# Patient Record
Sex: MALE | Race: WHITE | NOT HISPANIC OR LATINO | Employment: FULL TIME | URBAN - METROPOLITAN AREA
[De-identification: names, ages, dates, MRNs, and addresses within clinical notes are randomized per-mention and may not be internally consistent; named-entity substitution may affect disease eponyms.]

---

## 2019-10-19 ENCOUNTER — HOSPITAL ENCOUNTER (EMERGENCY)
Facility: HOSPITAL | Age: 29
Discharge: HOME | End: 2019-10-19
Attending: EMERGENCY MEDICINE | Admitting: EMERGENCY MEDICINE

## 2019-10-19 ENCOUNTER — APPOINTMENT (OUTPATIENT)
Dept: ULTRASOUND IMAGING | Facility: HOSPITAL | Age: 29
End: 2019-10-19

## 2019-10-19 ENCOUNTER — APPOINTMENT (OUTPATIENT)
Dept: CT IMAGING | Facility: HOSPITAL | Age: 29
End: 2019-10-19

## 2019-10-19 ENCOUNTER — HOSPITAL ENCOUNTER (EMERGENCY)
Facility: HOSPITAL | Age: 29
Discharge: HOME | End: 2019-10-20
Attending: EMERGENCY MEDICINE | Admitting: EMERGENCY MEDICINE

## 2019-10-19 VITALS
BODY MASS INDEX: 45.1 KG/M2 | OXYGEN SATURATION: 98 % | WEIGHT: 315 LBS | TEMPERATURE: 97.8 F | HEART RATE: 71 BPM | SYSTOLIC BLOOD PRESSURE: 136 MMHG | HEIGHT: 70 IN | RESPIRATION RATE: 16 BRPM | DIASTOLIC BLOOD PRESSURE: 85 MMHG

## 2019-10-19 VITALS
HEART RATE: 96 BPM | BODY MASS INDEX: 45.1 KG/M2 | SYSTOLIC BLOOD PRESSURE: 135 MMHG | HEIGHT: 70 IN | TEMPERATURE: 99.7 F | RESPIRATION RATE: 20 BRPM | WEIGHT: 315 LBS | DIASTOLIC BLOOD PRESSURE: 100 MMHG | OXYGEN SATURATION: 97 %

## 2019-10-19 DIAGNOSIS — N23 URETERAL COLIC: Primary | ICD-10-CM

## 2019-10-19 DIAGNOSIS — N20.0 RIGHT KIDNEY STONE: ICD-10-CM

## 2019-10-19 DIAGNOSIS — N28.1 RENAL CYST, LEFT: ICD-10-CM

## 2019-10-19 DIAGNOSIS — R10.9 LEFT FLANK PAIN: Primary | ICD-10-CM

## 2019-10-19 LAB
ALBUMIN SERPL-MCNC: 4.9 G/DL (ref 3.5–5.2)
ALBUMIN/GLOB SERPL: 1.5 G/DL
ALP SERPL-CCNC: 54 U/L (ref 39–117)
ALT SERPL W P-5'-P-CCNC: 23 U/L (ref 1–41)
ANION GAP SERPL CALCULATED.3IONS-SCNC: 8 MMOL/L (ref 5–15)
AST SERPL-CCNC: 16 U/L (ref 1–40)
BASOPHILS # BLD AUTO: 0.05 10*3/MM3 (ref 0–0.2)
BASOPHILS NFR BLD AUTO: 0.4 % (ref 0–1.5)
BILIRUB SERPL-MCNC: 0.4 MG/DL (ref 0.2–1.2)
BILIRUB UR QL STRIP: NEGATIVE
BUN BLD-MCNC: 17 MG/DL (ref 6–20)
BUN/CREAT SERPL: 18.7 (ref 7–25)
CALCIUM SPEC-SCNC: 9.4 MG/DL (ref 8.6–10.5)
CHLORIDE SERPL-SCNC: 102 MMOL/L (ref 98–107)
CLARITY UR: CLEAR
CO2 SERPL-SCNC: 29 MMOL/L (ref 22–29)
COLOR UR: YELLOW
CREAT BLD-MCNC: 0.91 MG/DL (ref 0.76–1.27)
DEPRECATED RDW RBC AUTO: 39.4 FL (ref 37–54)
EOSINOPHIL # BLD AUTO: 0.15 10*3/MM3 (ref 0–0.4)
EOSINOPHIL NFR BLD AUTO: 1.1 % (ref 0.3–6.2)
ERYTHROCYTE [DISTWIDTH] IN BLOOD BY AUTOMATED COUNT: 12.8 % (ref 12.3–15.4)
GFR SERPL CREATININE-BSD FRML MDRD: 99 ML/MIN/1.73
GLOBULIN UR ELPH-MCNC: 3.3 GM/DL
GLUCOSE BLD-MCNC: 110 MG/DL (ref 65–99)
GLUCOSE UR STRIP-MCNC: NEGATIVE MG/DL
HCT VFR BLD AUTO: 45 % (ref 37.5–51)
HGB BLD-MCNC: 15 G/DL (ref 13–17.7)
HGB UR QL STRIP.AUTO: NEGATIVE
HOLD SPECIMEN: NORMAL
IMM GRANULOCYTES # BLD AUTO: 0.07 10*3/MM3 (ref 0–0.05)
IMM GRANULOCYTES NFR BLD AUTO: 0.5 % (ref 0–0.5)
KETONES UR QL STRIP: NEGATIVE
LEUKOCYTE ESTERASE UR QL STRIP.AUTO: NEGATIVE
LIPASE SERPL-CCNC: 17 U/L (ref 13–60)
LYMPHOCYTES # BLD AUTO: 2.39 10*3/MM3 (ref 0.7–3.1)
LYMPHOCYTES NFR BLD AUTO: 17.8 % (ref 19.6–45.3)
MCH RBC QN AUTO: 28.3 PG (ref 26.6–33)
MCHC RBC AUTO-ENTMCNC: 33.3 G/DL (ref 31.5–35.7)
MCV RBC AUTO: 84.9 FL (ref 79–97)
MONOCYTES # BLD AUTO: 0.77 10*3/MM3 (ref 0.1–0.9)
MONOCYTES NFR BLD AUTO: 5.7 % (ref 5–12)
NEUTROPHILS # BLD AUTO: 10.03 10*3/MM3 (ref 1.7–7)
NEUTROPHILS NFR BLD AUTO: 74.5 % (ref 42.7–76)
NITRITE UR QL STRIP: NEGATIVE
NRBC BLD AUTO-RTO: 0 /100 WBC (ref 0–0.2)
PH UR STRIP.AUTO: 6 [PH] (ref 5–8)
PLATELET # BLD AUTO: 265 10*3/MM3 (ref 140–450)
PMV BLD AUTO: 8.6 FL (ref 6–12)
POTASSIUM BLD-SCNC: 4.3 MMOL/L (ref 3.5–5.2)
PROT SERPL-MCNC: 8.2 G/DL (ref 6–8.5)
PROT UR QL STRIP: NEGATIVE
RBC # BLD AUTO: 5.3 10*6/MM3 (ref 4.14–5.8)
SODIUM BLD-SCNC: 139 MMOL/L (ref 136–145)
SP GR UR STRIP: 1.02 (ref 1–1.03)
UROBILINOGEN UR QL STRIP: NORMAL
WBC NRBC COR # BLD: 13.46 10*3/MM3 (ref 3.4–10.8)
WHOLE BLOOD HOLD SPECIMEN: NORMAL

## 2019-10-19 PROCEDURE — 83690 ASSAY OF LIPASE: CPT | Performed by: EMERGENCY MEDICINE

## 2019-10-19 PROCEDURE — 80053 COMPREHEN METABOLIC PANEL: CPT | Performed by: EMERGENCY MEDICINE

## 2019-10-19 PROCEDURE — 25010000002 HYDROMORPHONE 1 MG/ML SOLUTION: Performed by: EMERGENCY MEDICINE

## 2019-10-19 PROCEDURE — 25010000002 IOPAMIDOL 61 % SOLUTION: Performed by: EMERGENCY MEDICINE

## 2019-10-19 PROCEDURE — 85025 COMPLETE CBC W/AUTO DIFF WBC: CPT | Performed by: EMERGENCY MEDICINE

## 2019-10-19 PROCEDURE — 96375 TX/PRO/DX INJ NEW DRUG ADDON: CPT

## 2019-10-19 PROCEDURE — 25010000002 KETOROLAC TROMETHAMINE PER 15 MG: Performed by: EMERGENCY MEDICINE

## 2019-10-19 PROCEDURE — 96374 THER/PROPH/DIAG INJ IV PUSH: CPT

## 2019-10-19 PROCEDURE — 74176 CT ABD & PELVIS W/O CONTRAST: CPT

## 2019-10-19 PROCEDURE — 99283 EMERGENCY DEPT VISIT LOW MDM: CPT

## 2019-10-19 PROCEDURE — 25010000002 ONDANSETRON PER 1 MG: Performed by: EMERGENCY MEDICINE

## 2019-10-19 PROCEDURE — 81003 URINALYSIS AUTO W/O SCOPE: CPT | Performed by: EMERGENCY MEDICINE

## 2019-10-19 PROCEDURE — 74177 CT ABD & PELVIS W/CONTRAST: CPT

## 2019-10-19 PROCEDURE — 76775 US EXAM ABDO BACK WALL LIM: CPT

## 2019-10-19 PROCEDURE — 99284 EMERGENCY DEPT VISIT MOD MDM: CPT

## 2019-10-19 RX ORDER — CYCLOBENZAPRINE HCL 10 MG
10 TABLET ORAL 3 TIMES DAILY PRN
Qty: 15 TABLET | Refills: 0 | OUTPATIENT
Start: 2019-10-19 | End: 2021-01-22

## 2019-10-19 RX ORDER — KETOROLAC TROMETHAMINE 15 MG/ML
15 INJECTION, SOLUTION INTRAMUSCULAR; INTRAVENOUS ONCE
Status: COMPLETED | OUTPATIENT
Start: 2019-10-19 | End: 2019-10-19

## 2019-10-19 RX ORDER — ONDANSETRON 4 MG/1
4 TABLET, ORALLY DISINTEGRATING ORAL EVERY 6 HOURS PRN
Qty: 15 TABLET | Refills: 0 | OUTPATIENT
Start: 2019-10-19 | End: 2021-01-22

## 2019-10-19 RX ORDER — ONDANSETRON 2 MG/ML
4 INJECTION INTRAMUSCULAR; INTRAVENOUS ONCE
Status: COMPLETED | OUTPATIENT
Start: 2019-10-19 | End: 2019-10-19

## 2019-10-19 RX ORDER — IBUPROFEN 800 MG/1
800 TABLET ORAL EVERY 8 HOURS PRN
Qty: 30 TABLET | Refills: 0 | OUTPATIENT
Start: 2019-10-19 | End: 2021-01-22

## 2019-10-19 RX ORDER — SODIUM CHLORIDE 0.9 % (FLUSH) 0.9 %
10 SYRINGE (ML) INJECTION AS NEEDED
Status: DISCONTINUED | OUTPATIENT
Start: 2019-10-19 | End: 2019-10-19 | Stop reason: HOSPADM

## 2019-10-19 RX ORDER — HYDROCODONE BITARTRATE AND ACETAMINOPHEN 5; 325 MG/1; MG/1
1 TABLET ORAL EVERY 6 HOURS PRN
Qty: 15 TABLET | Refills: 0 | OUTPATIENT
Start: 2019-10-19 | End: 2021-01-22

## 2019-10-19 RX ORDER — SODIUM CHLORIDE 0.9 % (FLUSH) 0.9 %
10 SYRINGE (ML) INJECTION AS NEEDED
Status: DISCONTINUED | OUTPATIENT
Start: 2019-10-19 | End: 2019-10-20 | Stop reason: HOSPADM

## 2019-10-19 RX ORDER — KETOROLAC TROMETHAMINE 30 MG/ML
30 INJECTION, SOLUTION INTRAMUSCULAR; INTRAVENOUS ONCE
Status: COMPLETED | OUTPATIENT
Start: 2019-10-19 | End: 2019-10-19

## 2019-10-19 RX ADMIN — HYDROMORPHONE HYDROCHLORIDE 1 MG: 1 INJECTION, SOLUTION INTRAMUSCULAR; INTRAVENOUS; SUBCUTANEOUS at 10:40

## 2019-10-19 RX ADMIN — KETOROLAC TROMETHAMINE 30 MG: 30 INJECTION, SOLUTION INTRAMUSCULAR at 10:40

## 2019-10-19 RX ADMIN — KETOROLAC TROMETHAMINE 15 MG: 15 INJECTION, SOLUTION INTRAMUSCULAR; INTRAVENOUS at 22:35

## 2019-10-19 RX ADMIN — ONDANSETRON 4 MG: 2 INJECTION INTRAMUSCULAR; INTRAVENOUS at 10:39

## 2019-10-19 RX ADMIN — IOPAMIDOL 100 ML: 612 INJECTION, SOLUTION INTRAVENOUS at 22:42

## 2019-10-19 RX ADMIN — HYDROMORPHONE HYDROCHLORIDE 1 MG: 1 INJECTION, SOLUTION INTRAMUSCULAR; INTRAVENOUS; SUBCUTANEOUS at 22:35

## 2019-10-19 RX ADMIN — SODIUM CHLORIDE 1000 ML: 9 INJECTION, SOLUTION INTRAVENOUS at 22:35

## 2019-10-19 RX ADMIN — SODIUM CHLORIDE 1000 ML: 9 INJECTION, SOLUTION INTRAVENOUS at 10:39

## 2019-10-19 NOTE — DISCHARGE INSTRUCTIONS
Follow up with one of the [facility] Primary Care Providers below to setup primary care. If you need assistance coordinating a primary care appointment with a [facility] Primary Care Provider, please contact the Primary Care Coordinators at [phone] for appointment scheduling.    [facility], Primary Care   [address] , [address]   [place], [place] [address] [phone]    [facility] Internal Medicine & Endocrinology  [address], [address]  [place] [address] [phone]    [facility] Family Medicine  [address], [address]   [place] [address] [phone]    [facility] Primary Care  [address], [address]  [place], [place] [id number] [phone]    [facility], Primary Care,   [address], [address]   [place], [place] [address] [phone]    [facility] Primary Care  [address]  [place], [place] [id number] [phone]    [facility], Primary Care  [address], [address]  [place], [place] [address] [phone]    [facility] Internal Medicine & Pediatrics  [address], [address]   [place] [address] [phone]    [facility], Primary Care  [address] [place] [address] [phone]      [facility] Primary Care  [address], [address]   [place] [address] [phone]    [facility] Family Medicine  [address] [place] [address] [phone]

## 2019-10-19 NOTE — ED PROVIDER NOTES
Subjective   Pt is a 28 yo male presenting to ED with complaints of left flank pain. He describes sudden onset of sharp left flank pain that radiates into his left abdomen. He was given a dose of Toradol yesterday with relief of the pain and was also adjusted by his chiropractor. Pain returned last night and has been constant with waves of sharp pain. He is having nausea but denies vomiting, diarrhea, urinary sx, scrotal pain / swelling, fever, chills, CP, SOB or dizziness. He denies hx of kidney stones. Pt is a  and does heavy lifting but denies a recent known injury. He takes no meds on a daily basis and denies drug, ETOH or tobacco use.          History provided by:  Patient and relative  Flank Pain   Pain location:  L flank  Pain quality: sharp    Pain radiates to:  LLQ  Pain severity:  Moderate  Onset quality:  Sudden  Duration:  1 day  Progression:  Waxing and waning  Chronicity:  New  Relieved by:  NSAIDs  Worsened by:  Nothing  Associated symptoms: nausea    Associated symptoms: no chest pain, no chills, no constipation, no cough, no diarrhea, no dysuria, no fatigue, no fever, no hematuria, no shortness of breath, no sore throat and no vomiting    Risk factors: obesity    Risk factors: no alcohol abuse and has not had multiple surgeries        Review of Systems   Constitutional: Negative for chills, fatigue and fever.   HENT: Negative for congestion, ear pain, sore throat and trouble swallowing.    Eyes: Negative for pain, redness and visual disturbance.   Respiratory: Negative for cough and shortness of breath.    Cardiovascular: Negative for chest pain and leg swelling.   Gastrointestinal: Positive for nausea. Negative for abdominal pain, blood in stool, constipation, diarrhea and vomiting.   Genitourinary: Positive for flank pain. Negative for difficulty urinating, dysuria, hematuria, scrotal swelling and testicular pain.   Musculoskeletal: Negative for arthralgias, back pain and joint  swelling.   Skin: Negative.  Negative for rash and wound.   Allergic/Immunologic: Negative.    Neurological: Negative for dizziness, syncope, weakness, numbness and headaches.   Psychiatric/Behavioral: Negative for confusion.   All other systems reviewed and are negative.      History reviewed. No pertinent past medical history.    No Known Allergies    Past Surgical History:   Procedure Laterality Date   • KNEE SURGERY         History reviewed. No pertinent family history.    Social History     Socioeconomic History   • Marital status:      Spouse name: Not on file   • Number of children: Not on file   • Years of education: Not on file   • Highest education level: Not on file   Tobacco Use   • Smoking status: Never Smoker   Substance and Sexual Activity   • Alcohol use: Yes     Comment: socially   • Drug use: No   • Sexual activity: Defer           Objective   Physical Exam   Constitutional: He is oriented to person, place, and time. He appears well-developed and well-nourished.   HENT:   Head: Atraumatic.   Nose: Nose normal.   Eyes: Conjunctivae, EOM and lids are normal. Pupils are equal, round, and reactive to light.   Neck: Normal range of motion. Neck supple.   Cardiovascular: Normal rate, regular rhythm and normal heart sounds.   Pulmonary/Chest: Effort normal and breath sounds normal.   Abdominal: Soft. He exhibits no distension. There is no tenderness. There is no rebound and no guarding.   Musculoskeletal: Normal range of motion. He exhibits no edema, tenderness or deformity.   Mild left flank TTP but patient describes an internal pain not palpable    Neurological: He is alert and oriented to person, place, and time. He has normal strength. No sensory deficit.   Skin: Skin is warm, dry and intact.   Psychiatric: He has a normal mood and affect. His behavior is normal.   Nursing note and vitals reviewed.      Procedures           ED Course  ED Course as of Oct 19 1739   Sat Oct 19, 2019   1206  [id number]. JORDAN query complete. Treatment plan to include limited course of prescribed  controlled substance. Risks including addiction, benefits, and alternatives presented to patient.   -RT  [CN]      ED Course User Index  [CN] Carmel Finch      Discussed results with patient and family. Will dc home on course of Norco, Flexeril, Zofran and Motrin. Discussed possible muscle spasm or recently passed stone. He will f/u with PCP. He will return to ED if new or worse sx.     Discussed patient and CT reading with Dr. Vaca.     Recent Results (from the past 24 hour(s))   Urinalysis With Microscopic If Indicated (No Culture) - Urine, Clean Catch    Collection Time: 10/19/19 10:09 AM   Result Value Ref Range    Color, UA Yellow Yellow, Straw    Appearance, UA Clear Clear    pH, UA 6.0 5.0 - 8.0    Specific Gravity, UA 1.020 1.001 - 1.030    Glucose, UA Negative Negative    Ketones, UA Negative Negative    Bilirubin, UA Negative Negative    Blood, UA Negative Negative    Protein, UA Negative Negative    Leuk Esterase, UA Negative Negative    Nitrite, UA Negative Negative    Urobilinogen, UA 0.2 E.U./dL 0.2 - 1.0 E.U./dL   Comprehensive Metabolic Panel    Collection Time: 10/19/19 10:13 AM   Result Value Ref Range    Glucose 110 (H) 65 - 99 mg/dL    BUN 17 6 - 20 mg/dL    Creatinine 0.91 0.76 - 1.27 mg/dL    Sodium 139 136 - 145 mmol/L    Potassium 4.3 3.5 - 5.2 mmol/L    Chloride 102 98 - 107 mmol/L    CO2 29.0 22.0 - 29.0 mmol/L    Calcium 9.4 8.6 - 10.5 mg/dL    Total Protein 8.2 6.0 - 8.5 g/dL    Albumin 4.90 3.50 - 5.20 g/dL    ALT (SGPT) 23 1 - 41 U/L    AST (SGOT) 16 1 - 40 U/L    Alkaline Phosphatase 54 39 - 117 U/L    Total Bilirubin 0.4 0.2 - 1.2 mg/dL    eGFR Non African Amer 99 >60 mL/min/1.73    Globulin 3.3 gm/dL    A/G Ratio 1.5 g/dL    BUN/Creatinine Ratio 18.7 7.0 - 25.0    Anion Gap 8.0 5.0 - 15.0 mmol/L   Lipase    Collection Time: 10/19/19 10:13 AM   Result Value Ref Range    Lipase 17 13 - 60  U/L   Light Blue Top    Collection Time: 10/19/19 10:13 AM   Result Value Ref Range    Extra Tube hold for add-on    Green Top (Gel)    Collection Time: 10/19/19 10:13 AM   Result Value Ref Range    Extra Tube Hold for add-ons.    Lavender Top    Collection Time: 10/19/19 10:13 AM   Result Value Ref Range    Extra Tube hold for add-on    Gold Top - SST    Collection Time: 10/19/19 10:13 AM   Result Value Ref Range    Extra Tube Hold for add-ons.    CBC Auto Differential    Collection Time: 10/19/19 10:13 AM   Result Value Ref Range    WBC 13.46 (H) 3.40 - 10.80 10*3/mm3    RBC 5.30 4.14 - 5.80 10*6/mm3    Hemoglobin 15.0 13.0 - 17.7 g/dL    Hematocrit 45.0 37.5 - 51.0 %    MCV 84.9 79.0 - 97.0 fL    MCH 28.3 26.6 - 33.0 pg    MCHC 33.3 31.5 - 35.7 g/dL    RDW 12.8 12.3 - 15.4 %    RDW-SD 39.4 37.0 - 54.0 fl    MPV 8.6 6.0 - 12.0 fL    Platelets 265 140 - 450 10*3/mm3    Neutrophil % 74.5 42.7 - 76.0 %    Lymphocyte % 17.8 (L) 19.6 - 45.3 %    Monocyte % 5.7 5.0 - 12.0 %    Eosinophil % 1.1 0.3 - 6.2 %    Basophil % 0.4 0.0 - 1.5 %    Immature Grans % 0.5 0.0 - 0.5 %    Neutrophils, Absolute 10.03 (H) 1.70 - 7.00 10*3/mm3    Lymphocytes, Absolute 2.39 0.70 - 3.10 10*3/mm3    Monocytes, Absolute 0.77 0.10 - 0.90 10*3/mm3    Eosinophils, Absolute 0.15 0.00 - 0.40 10*3/mm3    Basophils, Absolute 0.05 0.00 - 0.20 10*3/mm3    Immature Grans, Absolute 0.07 (H) 0.00 - 0.05 10*3/mm3    nRBC 0.0 0.0 - 0.2 /100 WBC     Note: In addition to lab results from this visit, the labs listed above may include labs taken at another facility or during a different encounter within the last 24 hours. Please correlate lab times with ED admission and discharge times for further clarification of the services performed during this visit.    CT Abdomen Pelvis Without Contrast   Final Result   Tiny 1 mm nonobstructing stone seen on the right kidney with   small cyst on the left kidney. No acute intra-abdominal or pelvic   abnormality.      "  DICTATED:   10/19/2019   EDITED/ls :   10/19/2019            This report was finalized on 10/19/2019 5:10 PM by Dr. Alysia Wilde MD.            Vitals:    10/19/19 0956 10/19/19 1100 10/19/19 1200 10/19/19 1236   BP: 156/89 116/71 122/66 136/85   BP Location: Right arm   Left arm   Patient Position: Sitting   Lying   Pulse: 85   71   Resp: 18   16   Temp: 97.8 °F (36.6 °C)      TempSrc: Oral      SpO2: 98% 97% 98% 98%   Weight: (!) 156 kg (343 lb)      Height: 177.8 cm (70\")        Medications   ketorolac (TORADOL) injection 30 mg (30 mg Intravenous Given 10/19/19 1040)   HYDROmorphone (DILAUDID) injection 1 mg (1 mg Intravenous Given 10/19/19 1040)   ondansetron (ZOFRAN) injection 4 mg (4 mg Intravenous Given 10/19/19 1039)   sodium chloride 0.9 % bolus 1,000 mL (0 mL Intravenous Stopped 10/19/19 1109)     ECG/EMG Results (last 24 hours)     ** No results found for the last 24 hours. **        No orders to display                 MDM    Final diagnoses:   Left flank pain   Right kidney stone              Haley Back PA  10/19/19 8224    "

## 2019-10-20 ENCOUNTER — APPOINTMENT (OUTPATIENT)
Dept: ULTRASOUND IMAGING | Facility: HOSPITAL | Age: 29
End: 2019-10-20

## 2019-10-20 PROCEDURE — 76870 US EXAM SCROTUM: CPT

## 2019-10-20 RX ORDER — KETOROLAC TROMETHAMINE 10 MG/1
10 TABLET, FILM COATED ORAL EVERY 6 HOURS PRN
Qty: 15 TABLET | Refills: 0 | Status: SHIPPED | OUTPATIENT
Start: 2019-10-20 | End: 2019-10-25

## 2019-10-20 NOTE — ED PROVIDER NOTES
Subjective   Mr. Vidal Woodson is a 29 y.o. male who presents to the ED with c/o flank pain. He reports this pain onset 2 days ago and has been constant since. The pain is severe and he is unable to find a comfortable position. He was seen here in the ED today for this same complaint and his CT abdomen/pelvis w/o contrast did not reveal a kidney stone and his urinalysis was normal. He was given Toradol and Dilaudid here in the ED and was prescribed hydrocodone upon discharge. He reports the pain was temporarily relieved his pain but has returned. The pain has begun radiating to his suprapubic region and he feels a severe squeezing sensation in his scrotum and testicles. He denies a surgical history. There are no other acute symptoms at this time.        History provided by:  Patient  Flank Pain   Pain location:  L flank  Pain quality: squeezing    Pain radiates to:  Suprapubic region and scrotum  Pain severity:  Moderate  Onset quality:  Sudden  Duration:  1 day  Timing:  Constant  Progression:  Unchanged  Chronicity:  New  Relieved by:  Nothing  Worsened by:  Nothing  Ineffective treatments: Hydrocodone.  Associated symptoms: no chest pain, no dysuria and no shortness of breath    Risk factors: obesity        Review of Systems   Respiratory: Negative for shortness of breath.    Cardiovascular: Negative for chest pain.   Genitourinary: Positive for flank pain. Negative for difficulty urinating and dysuria.   All other systems reviewed and are negative.      History reviewed. No pertinent past medical history.    No Known Allergies    Past Surgical History:   Procedure Laterality Date   • KNEE SURGERY         History reviewed. No pertinent family history.    Social History     Socioeconomic History   • Marital status:      Spouse name: Not on file   • Number of children: Not on file   • Years of education: Not on file   • Highest education level: Not on file   Tobacco Use   • Smoking status: Never  Smoker   Substance and Sexual Activity   • Alcohol use: Yes     Comment: socially   • Drug use: No   • Sexual activity: Defer         Objective   Physical Exam   Constitutional: He is oriented to person, place, and time. He appears well-developed and well-nourished. No distress.   He appears uncomfortable.    HENT:   Head: Normocephalic and atraumatic.   Nose: Nose normal.   Eyes: Conjunctivae are normal. No scleral icterus.   Neck: Normal range of motion. Neck supple.   Cardiovascular: Normal rate, regular rhythm and normal heart sounds.   No murmur heard.  Pulmonary/Chest: Effort normal and breath sounds normal. No respiratory distress.   Abdominal: Soft.   Tenderness to percussion over the left flank. There is tenderness in the left lower quadrant and left mid abdomen.   Genitourinary:   Genitourinary Comments: Both testicles are well descended. No redness, swelling, or tenderness.   Musculoskeletal: Normal range of motion. He exhibits no edema.   Neurological: He is alert and oriented to person, place, and time.   Skin: Skin is warm and dry.   Psychiatric: He has a normal mood and affect. His behavior is normal.   Nursing note and vitals reviewed.      Procedures         ED Course     Recent Results (from the past 24 hour(s))   Urinalysis With Microscopic If Indicated (No Culture) - Urine, Clean Catch    Collection Time: 10/19/19 10:09 AM   Result Value Ref Range    Color, UA Yellow Yellow, Straw    Appearance, UA Clear Clear    pH, UA 6.0 5.0 - 8.0    Specific Gravity, UA 1.020 1.001 - 1.030    Glucose, UA Negative Negative    Ketones, UA Negative Negative    Bilirubin, UA Negative Negative    Blood, UA Negative Negative    Protein, UA Negative Negative    Leuk Esterase, UA Negative Negative    Nitrite, UA Negative Negative    Urobilinogen, UA 0.2 E.U./dL 0.2 - 1.0 E.U./dL   Comprehensive Metabolic Panel    Collection Time: 10/19/19 10:13 AM   Result Value Ref Range    Glucose 110 (H) 65 - 99 mg/dL    BUN 17 6  - 20 mg/dL    Creatinine 0.91 0.76 - 1.27 mg/dL    Sodium 139 136 - 145 mmol/L    Potassium 4.3 3.5 - 5.2 mmol/L    Chloride 102 98 - 107 mmol/L    CO2 29.0 22.0 - 29.0 mmol/L    Calcium 9.4 8.6 - 10.5 mg/dL    Total Protein 8.2 6.0 - 8.5 g/dL    Albumin 4.90 3.50 - 5.20 g/dL    ALT (SGPT) 23 1 - 41 U/L    AST (SGOT) 16 1 - 40 U/L    Alkaline Phosphatase 54 39 - 117 U/L    Total Bilirubin 0.4 0.2 - 1.2 mg/dL    eGFR Non African Amer 99 >60 mL/min/1.73    Globulin 3.3 gm/dL    A/G Ratio 1.5 g/dL    BUN/Creatinine Ratio 18.7 7.0 - 25.0    Anion Gap 8.0 5.0 - 15.0 mmol/L   Lipase    Collection Time: 10/19/19 10:13 AM   Result Value Ref Range    Lipase 17 13 - 60 U/L   Light Blue Top    Collection Time: 10/19/19 10:13 AM   Result Value Ref Range    Extra Tube hold for add-on    Green Top (Gel)    Collection Time: 10/19/19 10:13 AM   Result Value Ref Range    Extra Tube Hold for add-ons.    Lavender Top    Collection Time: 10/19/19 10:13 AM   Result Value Ref Range    Extra Tube hold for add-on    Gold Top - SST    Collection Time: 10/19/19 10:13 AM   Result Value Ref Range    Extra Tube Hold for add-ons.    CBC Auto Differential    Collection Time: 10/19/19 10:13 AM   Result Value Ref Range    WBC 13.46 (H) 3.40 - 10.80 10*3/mm3    RBC 5.30 4.14 - 5.80 10*6/mm3    Hemoglobin 15.0 13.0 - 17.7 g/dL    Hematocrit 45.0 37.5 - 51.0 %    MCV 84.9 79.0 - 97.0 fL    MCH 28.3 26.6 - 33.0 pg    MCHC 33.3 31.5 - 35.7 g/dL    RDW 12.8 12.3 - 15.4 %    RDW-SD 39.4 37.0 - 54.0 fl    MPV 8.6 6.0 - 12.0 fL    Platelets 265 140 - 450 10*3/mm3    Neutrophil % 74.5 42.7 - 76.0 %    Lymphocyte % 17.8 (L) 19.6 - 45.3 %    Monocyte % 5.7 5.0 - 12.0 %    Eosinophil % 1.1 0.3 - 6.2 %    Basophil % 0.4 0.0 - 1.5 %    Immature Grans % 0.5 0.0 - 0.5 %    Neutrophils, Absolute 10.03 (H) 1.70 - 7.00 10*3/mm3    Lymphocytes, Absolute 2.39 0.70 - 3.10 10*3/mm3    Monocytes, Absolute 0.77 0.10 - 0.90 10*3/mm3    Eosinophils, Absolute 0.15 0.00 - 0.40  10*3/mm3    Basophils, Absolute 0.05 0.00 - 0.20 10*3/mm3    Immature Grans, Absolute 0.07 (H) 0.00 - 0.05 10*3/mm3    nRBC 0.0 0.0 - 0.2 /100 WBC   Light Blue Top    Collection Time: 10/19/19 10:36 PM   Result Value Ref Range    Extra Tube hold for add-on    Green Top (Gel)    Collection Time: 10/19/19 10:36 PM   Result Value Ref Range    Extra Tube Hold for add-ons.    Lavender Top    Collection Time: 10/19/19 10:36 PM   Result Value Ref Range    Extra Tube hold for add-on    Gold Top - SST    Collection Time: 10/19/19 10:36 PM   Result Value Ref Range    Extra Tube Hold for add-ons.      Note: In addition to lab results from this visit, the labs listed above may include labs taken at another facility or during a different encounter within the last 24 hours. Please correlate lab times with ED admission and discharge times for further clarification of the services performed during this visit.    US Scrotum & Testicles   Final Result   Normal      Signer Name: Ravi Medina MD    Signed: 10/20/2019 1:18 AM    Workstation [facility]: [facility]     Radiology [facility]      US Renal Limited   Final Result   No suspicious ultrasound features to the left renal cyst. The ultrasound confirms the cystic nature of this lesion.      Signer Name: Ravi Medina MD    Signed: 10/20/2019 1:17 AM    Workstation [facility]: [facility]     Radiology [facility]      CT Abdomen Pelvis With Contrast   Final Result   Left renal cyst with mildly irregular margins. Consider follow-up ultrasound to evaluate for cystic versus solid nature of this lesion. It likely represents a benign left renal cyst. No evidence of inflammatory bowel disease. No acute findings otherwise.               Signer Name: Ravi Medina MD    Signed: 10/19/2019 11:02 PM    Workstation Name: [facility]     [facility]        Vitals:    10/19/19 2045 10/19/19 2130 10/19/19 2131 10/19/19 2230   BP: 161/88 150/90  135/100  "  BP Location: Left arm      Patient Position: Sitting      Pulse: 96      Resp: 20      Temp: 99.7 °F (37.6 °C)      TempSrc: Oral      SpO2: 94%  95% 97%   Weight: (!) 156 kg (343 lb)      Height: 177.8 cm (70\")        Medications   sodium chloride 0.9 % flush 10 mL (not administered)   sodium chloride 0.9 % bolus 1,000 mL (0 mL Intravenous Stopped 10/19/19 2319)   ketorolac (TORADOL) injection 15 mg (15 mg Intravenous Given 10/19/19 2235)   HYDROmorphone (DILAUDID) injection 1 mg (1 mg Intravenous Given 10/19/19 2235)   iopamidol (ISOVUE-300) 61 % injection 100 mL (100 mL Intravenous Given 10/19/19 2242)     ECG/EMG Results (last 24 hours)     ** No results found for the last 24 hours. **        No orders to display                       MDM  Number of Diagnoses or Management Options  Renal cyst, left: new and requires workup  Ureteral colic: new and requires workup  Diagnosis management comments: CT the abdomen with contrast shows a left renal cyst most likely is benign.  Ultrasound of the kidney was recommended.  Ultrasound of the left kidney and the testicle was performed.  No acute abnormalities were present.    The patient's previous lab evaluation and urinalysis were reviewed and were non-concerning.    Patient had complete relief of symptoms with the administration of Toradol and Dilaudid here in ER.  He has maintained normal and stable vital signs.    The patient at the end the ER course desires discharge.  He will be advised to follow-up with urology for outpatient evaluation.    He is advised to return to the ER with any further concern.       Amount and/or Complexity of Data Reviewed  Clinical lab tests: ordered and reviewed  Tests in the radiology section of CPT®: ordered and reviewed  Obtain history from someone other than the patient: yes  Review and summarize past medical records: yes  Independent visualization of images, tracings, or specimens: yes        Final diagnoses:   Ureteral colic   Renal " cyst, left       Documentation assistance provided by puma Bowens.  Information recorded by the scribe was done at my direction and has been verified and validated by me.     Ellis Bowens  10/19/19 9848       Ellis Bowens  10/19/19 5886       Channing Coffman MD  10/20/19 7220

## 2019-10-20 NOTE — DISCHARGE INSTRUCTIONS
The patient is advised to follow-up with urology for further outpatient evaluation.    Take previously prescribed hydrocodone, or Toradol as needed to help with any pain that occurs.    Rest and drink plenty of fluids.    Return to the ER with any further concern.

## 2022-06-08 ENCOUNTER — OFFICE VISIT (OUTPATIENT)
Dept: FAMILY MEDICINE CLINIC | Facility: CLINIC | Age: 32
End: 2022-06-08

## 2022-06-08 VITALS
BODY MASS INDEX: 45.1 KG/M2 | DIASTOLIC BLOOD PRESSURE: 88 MMHG | SYSTOLIC BLOOD PRESSURE: 132 MMHG | OXYGEN SATURATION: 98 % | TEMPERATURE: 98.7 F | WEIGHT: 315 LBS | HEIGHT: 70 IN | HEART RATE: 73 BPM

## 2022-06-08 DIAGNOSIS — M76.32 ILIOTIBIAL BAND SYNDROME OF LEFT SIDE: ICD-10-CM

## 2022-06-08 DIAGNOSIS — N48.1 BALANITIS: ICD-10-CM

## 2022-06-08 DIAGNOSIS — E78.5 DYSLIPIDEMIA: ICD-10-CM

## 2022-06-08 DIAGNOSIS — I10 PRIMARY HYPERTENSION: ICD-10-CM

## 2022-06-08 DIAGNOSIS — G47.31 PRIMARY CENTRAL SLEEP APNEA: Primary | ICD-10-CM

## 2022-06-08 DIAGNOSIS — E66.01 MORBID (SEVERE) OBESITY DUE TO EXCESS CALORIES: ICD-10-CM

## 2022-06-08 DIAGNOSIS — Z30.09 FAMILY PLANNING: ICD-10-CM

## 2022-06-08 DIAGNOSIS — Z11.59 ENCOUNTER FOR HEPATITIS C SCREENING TEST FOR LOW RISK PATIENT: ICD-10-CM

## 2022-06-08 PROCEDURE — 99215 OFFICE O/P EST HI 40 MIN: CPT | Performed by: INTERNAL MEDICINE

## 2022-06-08 RX ORDER — FLUCONAZOLE 100 MG/1
100 TABLET ORAL DAILY
Qty: 14 TABLET | Refills: 2 | Status: SHIPPED | OUTPATIENT
Start: 2022-06-08 | End: 2022-07-15 | Stop reason: SDUPTHER

## 2022-06-08 RX ORDER — NYSTATIN 100000 U/G
1 CREAM TOPICAL 2 TIMES DAILY
Qty: 60 G | Refills: 6 | Status: SHIPPED | OUTPATIENT
Start: 2022-06-08

## 2022-06-08 NOTE — ASSESSMENT & PLAN NOTE
To rule out diabetes.  I placed him on fluconazole as well as nystatin.  With recurrent seen in the absence of diabetes, referred him to urology.  Ongoing weight loss will be paramount

## 2022-06-08 NOTE — ASSESSMENT & PLAN NOTE
Patient's (Body mass index is 46.14 kg/m².) indicates that they are morbidly obese (BMI > 40 or > 35 with obesity - related health condition) with health conditions that include obstructive sleep apnea, hypertension and dyslipidemias . Weight is improving with lifestyle modifications. BMI is is above average; BMI management plan is completed. We discussed low calorie, low carb based diet program, portion control and increasing exercise.

## 2022-06-08 NOTE — ASSESSMENT & PLAN NOTE
Hypertension is improving with lifestyle modifications.  Continue current treatment regimen.  Dietary sodium restriction.  Weight loss.  Regular aerobic exercise.  Ambulatory blood pressure monitoring.  Blood pressure will be reassessed at the next regular appointment.

## 2022-06-08 NOTE — PROGRESS NOTES
"Vidal Woodson  1990  [id number]  Patient Care Team:  Franklin Gutierrez MD as PCP - General (Internal Medicine)    Vidal Woodson is a 31 y.o. male here today to establish care.  This patient is accompanied by their self who contributes to the history of their care.    Chief Complaint:    Chief Complaint   Patient presents with   • Establish Care         History of Present Illness:    Here to establish. Was seen by [facility]- Had a stress test done last week  For evaluation of chest pain and dizziness during training and [facility]. No further chest pain. Chest pain was 6/10- squeezing with exertion resolving after 10 min. He has PRICE currently on CPAP. Has been on cpap for decades as rx's with [facility]. No recent upgrades- using same machine as originally provided.There is no restful sleep. Would like new sleep md.     Has never required BP medications, has been in 150's/90's at work. Does not check at home. Does not watch NaCl  Intake. Works out at work 4 days per week including running 2.5 mile in 20 min, planks, sleds, pull ups. Is working with a nutritionist for past one year with a 70 lb weight loss.    Has recurrent balanitis. Usually treated with fluconazole and nystatin creme. 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 Typically flares constantly. Has no recent blood work to evaluate for DM.    Had right knee menicsus surgery with [facility] and [facility]. Last surgery was June 2020. Has daily symptoms of pain in left KNEE since surgey. Has seen PT. Has not seen ortho for this since post op follow up. Was told was 2./2 \"compensation\". Squatting causes severe lateral pain from knee to hip. There is no instability. Has not performed PT for this- was told will go away.    Past Medical History:   Diagnosis Date   • Dyspnea on exertion    • History of palpitations    • Other chest pain    • Primary central sleep apnea    • Primary hypertension    • Vision problem        Past " "Surgical History:   Procedure Laterality Date   • KNEE SURGERY     • KNEE SURGERY Right 2008    &         Family History   Problem Relation Age of Onset   • Drug abuse Mother    • Stroke Maternal Grandmother    • Diabetes Maternal Grandfather        Social History     Socioeconomic History   • Marital status:    Tobacco Use   • Smoking status: Former Smoker     Packs/day: 0.25     Years: 0.50     Pack years: 0.12     Start date: 2010     Quit date: 2011     Years since quittin.4   • Smokeless tobacco: Current User   Vaping Use   • Vaping Use: Never used   Substance and Sexual Activity   • Alcohol use: Not Currently     Alcohol/week: 22.0 standard drinks     Types: 12 Cans of beer, 10 Shots of liquor per week     Comment: socially   • Drug use: No   • Sexual activity: Yes     Partners: Female       No Known Allergies    Review of Systems:    Review of Systems   Constitutional: Negative for unexpected weight gain.   HENT: Negative.         Sniring     Respiratory: Positive for apnea.    Cardiovascular: Negative.    Gastrointestinal: Negative.    Endocrine: Negative.  Negative for cold intolerance, heat intolerance, polydipsia and polyuria.   Genitourinary: Positive for penile pain.   Musculoskeletal: Positive for arthralgias.   Skin: Negative.    Neurological: Negative.    Hematological: Negative for adenopathy. Does not bruise/bleed easily.       Vitals:    22 0917   BP: 132/88   BP Location: Left arm   Patient Position: Sitting   Cuff Size: Large Adult   Pulse: 73   Temp: 98.7 °F (37.1 °C)   SpO2: 98%   Weight: (!) 146 kg (321 lb 9.6 oz)   Height: 177.8 cm (70\")   PainSc: 0-No pain     Body mass index is 46.14 kg/m².      Current Outpatient Medications:   •  fluconazole (Diflucan) 100 MG tablet, Take 1 tablet by mouth Daily., Disp: 14 tablet, Rfl: 2  •  nystatin (MYCOSTATIN) 092389 UNIT/GM cream, Apply 1 application topically to the appropriate area as directed 2 (Two) Times a " Day., Disp: 60 g, Rfl: 6    Physical Exam:    Physical Exam  Vitals and nursing note reviewed.   Constitutional:       General: He is not in acute distress.     Appearance: Normal appearance. He is well-developed. He is obese. He is not diaphoretic.   HENT:      Head: Normocephalic and atraumatic.      Right Ear: Tympanic membrane and external ear normal.      Left Ear: Tympanic membrane and external ear normal.      Mouth/Throat:      Mouth: Mucous membranes are dry.      Pharynx: No oropharyngeal exudate.   Eyes:      General: No scleral icterus.        Right eye: No discharge.      Extraocular Movements: Extraocular movements intact.      Conjunctiva/sclera: Conjunctivae normal.   Neck:      Thyroid: No thyromegaly.      Vascular: No JVD.      Trachea: No tracheal deviation.   Cardiovascular:      Rate and Rhythm: Normal rate and regular rhythm.      Heart sounds: Normal heart sounds.      Comments: PMI nondisplaced  Pulmonary:      Effort: Pulmonary effort is normal.      Breath sounds: Normal breath sounds. No wheezing or rales.   Abdominal:      General: Bowel sounds are normal.      Palpations: Abdomen is soft.      Tenderness: There is no abdominal tenderness. There is no guarding or rebound.   Genitourinary:     Testes: Normal.      Comments: Penis is retracted.  Partial foreskin intact.  Glans penis is erythematous with white cheesy material.  Musculoskeletal:         General: Tenderness present. No deformity.      Cervical back: Normal range of motion and neck supple.      Right lower leg: No edema.      Left lower leg: No edema.      Comments: Normal gait.  There is lateral knee thigh tenderness to palpation.  Worse with forced internal rotation of the hip.   Lymphadenopathy:      Cervical: No cervical adenopathy.   Skin:     General: Skin is warm and dry.      Capillary Refill: Capillary refill takes less than 2 seconds.      Coloration: Skin is not pale.      Findings: No rash.   Neurological:       Mental Status: He is alert and oriented to person, place, and time.      Motor: No abnormal muscle tone.      Coordination: Coordination normal.   Psychiatric:         Mood and Affect: Mood normal.         Behavior: Behavior normal.         Judgment: Judgment normal.         Procedures    Results Review:    None    Assessment/Plan:     Problem List Items Addressed This Visit        Cardiac and Vasculature    Primary hypertension    Current Assessment & Plan     Hypertension is improving with lifestyle modifications.  Continue current treatment regimen.  Dietary sodium restriction.  Weight loss.  Regular aerobic exercise.  Ambulatory blood pressure monitoring.  Blood pressure will be reassessed at the next regular appointment.           Relevant Orders    CBC & Differential    Comprehensive Metabolic Panel    TSH Rfx On Abnormal To Free T4    Dyslipidemia    Current Assessment & Plan     I have requested fasting lipid profile.  He has had a 70 pound weight loss so I suspect improvement in his lipid panel.           Relevant Orders    Lipid Panel       Endocrine and Metabolic    Morbid (severe) obesity due to excess calories (HCC)    Current Assessment & Plan     Patient's (Body mass index is 46.14 kg/m².) indicates that they are morbidly obese (BMI > 40 or > 35 with obesity - related health condition) with health conditions that include obstructive sleep apnea, hypertension and dyslipidemias . Weight is improving with lifestyle modifications. BMI is is above average; BMI management plan is completed. We discussed low calorie, low carb based diet program, portion control and increasing exercise.               Genitourinary and Reproductive     Balanitis    Current Assessment & Plan     To rule out diabetes.  I placed him on fluconazole as well as nystatin.  With recurrent seen in the absence of diabetes, referred him to urology.  Ongoing weight loss will be paramount           Relevant Orders    Ambulatory Referral to  Urology (Completed)    Family planning    Relevant Orders    Ambulatory Referral to Urology (Completed)       Sleep    Primary central sleep apnea - Primary       Other    Encounter for hepatitis C screening test for low risk patient    Relevant Orders    Hepatitis C Antibody      Other Visit Diagnoses     Iliotibial band syndrome of left side        Relevant Orders    Ambulatory Referral to Physical Therapy Evaluate and treat          Plan of care reviewed with patient at the conclusion of today's visit. Education was provided regarding diagnosis and management.  Patient verbalizes understanding of and agreement with management plan.    Return in about 2 months (around 8/8/2022) for Annual.    Franklin Gutierrez MD    Time spent was greater than 60 minutes on this new patient.  Please note than portions of this note were completed [facility] a Voice Recognition Program

## 2022-06-08 NOTE — ASSESSMENT & PLAN NOTE
I have requested fasting lipid profile.  He has had a 70 pound weight loss so I suspect improvement in his lipid panel.

## 2022-07-13 ENCOUNTER — LAB (OUTPATIENT)
Dept: LAB | Facility: HOSPITAL | Age: 32
End: 2022-07-13

## 2022-07-13 DIAGNOSIS — Z11.59 ENCOUNTER FOR HEPATITIS C SCREENING TEST FOR LOW RISK PATIENT: ICD-10-CM

## 2022-07-13 DIAGNOSIS — I10 PRIMARY HYPERTENSION: ICD-10-CM

## 2022-07-13 DIAGNOSIS — E78.5 DYSLIPIDEMIA: ICD-10-CM

## 2022-07-13 LAB
ALBUMIN SERPL-MCNC: 4.6 G/DL (ref 3.5–5.2)
ALBUMIN/GLOB SERPL: 1.7 G/DL
ALP SERPL-CCNC: 62 U/L (ref 39–117)
ALT SERPL W P-5'-P-CCNC: 26 U/L (ref 1–41)
ANION GAP SERPL CALCULATED.3IONS-SCNC: 10.7 MMOL/L (ref 5–15)
AST SERPL-CCNC: 13 U/L (ref 1–40)
BASOPHILS # BLD AUTO: 0.05 10*3/MM3 (ref 0–0.2)
BASOPHILS NFR BLD AUTO: 0.5 % (ref 0–1.5)
BILIRUB SERPL-MCNC: 0.5 MG/DL (ref 0–1.2)
BUN SERPL-MCNC: 17 MG/DL (ref 6–20)
BUN/CREAT SERPL: 19.5 (ref 7–25)
CALCIUM SPEC-SCNC: 9.6 MG/DL (ref 8.6–10.5)
CHLORIDE SERPL-SCNC: 102 MMOL/L (ref 98–107)
CHOLEST SERPL-MCNC: 132 MG/DL (ref 0–200)
CO2 SERPL-SCNC: 25.3 MMOL/L (ref 22–29)
CREAT SERPL-MCNC: 0.87 MG/DL (ref 0.76–1.27)
DEPRECATED RDW RBC AUTO: 40 FL (ref 37–54)
EGFRCR SERPLBLD CKD-EPI 2021: 118.3 ML/MIN/1.73
EOSINOPHIL # BLD AUTO: 0.2 10*3/MM3 (ref 0–0.4)
EOSINOPHIL NFR BLD AUTO: 2.2 % (ref 0.3–6.2)
ERYTHROCYTE [DISTWIDTH] IN BLOOD BY AUTOMATED COUNT: 12.9 % (ref 12.3–15.4)
GLOBULIN UR ELPH-MCNC: 2.7 GM/DL
GLUCOSE SERPL-MCNC: 83 MG/DL (ref 65–99)
HCT VFR BLD AUTO: 42.1 % (ref 37.5–51)
HCV AB SER DONR QL: NORMAL
HDLC SERPL-MCNC: 38 MG/DL (ref 40–60)
HGB BLD-MCNC: 14 G/DL (ref 13–17.7)
IMM GRANULOCYTES # BLD AUTO: 0.04 10*3/MM3 (ref 0–0.05)
IMM GRANULOCYTES NFR BLD AUTO: 0.4 % (ref 0–0.5)
LDLC SERPL CALC-MCNC: 78 MG/DL (ref 0–100)
LDLC/HDLC SERPL: 2.05 {RATIO}
LYMPHOCYTES # BLD AUTO: 2.12 10*3/MM3 (ref 0.7–3.1)
LYMPHOCYTES NFR BLD AUTO: 23.2 % (ref 19.6–45.3)
MCH RBC QN AUTO: 28.8 PG (ref 26.6–33)
MCHC RBC AUTO-ENTMCNC: 33.3 G/DL (ref 31.5–35.7)
MCV RBC AUTO: 86.6 FL (ref 79–97)
MONOCYTES # BLD AUTO: 0.69 10*3/MM3 (ref 0.1–0.9)
MONOCYTES NFR BLD AUTO: 7.5 % (ref 5–12)
NEUTROPHILS NFR BLD AUTO: 6.05 10*3/MM3 (ref 1.7–7)
NEUTROPHILS NFR BLD AUTO: 66.2 % (ref 42.7–76)
NRBC BLD AUTO-RTO: 0 /100 WBC (ref 0–0.2)
PLATELET # BLD AUTO: 242 10*3/MM3 (ref 140–450)
PMV BLD AUTO: 9.2 FL (ref 6–12)
POTASSIUM SERPL-SCNC: 4.4 MMOL/L (ref 3.5–5.2)
PROT SERPL-MCNC: 7.3 G/DL (ref 6–8.5)
RBC # BLD AUTO: 4.86 10*6/MM3 (ref 4.14–5.8)
SODIUM SERPL-SCNC: 138 MMOL/L (ref 136–145)
TRIGL SERPL-MCNC: 81 MG/DL (ref 0–150)
TSH SERPL DL<=0.05 MIU/L-ACNC: 3.13 UIU/ML (ref 0.27–4.2)
VLDLC SERPL-MCNC: 16 MG/DL (ref 5–40)
WBC NRBC COR # BLD: 9.15 10*3/MM3 (ref 3.4–10.8)

## 2022-07-13 PROCEDURE — 80050 GENERAL HEALTH PANEL: CPT

## 2022-07-13 PROCEDURE — 86803 HEPATITIS C AB TEST: CPT

## 2022-07-13 PROCEDURE — 80061 LIPID PANEL: CPT

## 2022-07-15 ENCOUNTER — OFFICE VISIT (OUTPATIENT)
Dept: FAMILY MEDICINE CLINIC | Facility: CLINIC | Age: 32
End: 2022-07-15

## 2022-07-15 VITALS
DIASTOLIC BLOOD PRESSURE: 88 MMHG | WEIGHT: 310.2 LBS | HEART RATE: 82 BPM | HEIGHT: 70 IN | SYSTOLIC BLOOD PRESSURE: 136 MMHG | BODY MASS INDEX: 44.41 KG/M2 | OXYGEN SATURATION: 98 %

## 2022-07-15 DIAGNOSIS — N48.1 BALANITIS: ICD-10-CM

## 2022-07-15 DIAGNOSIS — K60.2 RECTAL FISSURE: ICD-10-CM

## 2022-07-15 DIAGNOSIS — R06.02 SHORTNESS OF BREATH: Primary | ICD-10-CM

## 2022-07-15 DIAGNOSIS — R91.1 RIGHT LOWER LOBE PULMONARY NODULE: ICD-10-CM

## 2022-07-15 DIAGNOSIS — R07.89 OTHER CHEST PAIN: ICD-10-CM

## 2022-07-15 PROCEDURE — 99214 OFFICE O/P EST MOD 30 MIN: CPT | Performed by: INTERNAL MEDICINE

## 2022-07-15 RX ORDER — ALBUTEROL SULFATE 90 UG/1
2 AEROSOL, METERED RESPIRATORY (INHALATION) EVERY 4 HOURS PRN
Qty: 18 G | Refills: 3 | Status: SHIPPED | OUTPATIENT
Start: 2022-07-15

## 2022-07-15 RX ORDER — FLUCONAZOLE 100 MG/1
100 TABLET ORAL DAILY
Qty: 14 TABLET | Refills: 2 | Status: SHIPPED | OUTPATIENT
Start: 2022-07-15

## 2022-07-15 NOTE — ASSESSMENT & PLAN NOTE
I suspect this gentlemen's shortness of breath exertional chest pain is likely exercise-induced asthma.  I referred him to pulmonary for this as well as further evaluation of the right lower lobe pulmonary nodule (1 cm that was noted on recent CT of the chest from [place].  Copies of the CT have been requested.

## 2022-07-15 NOTE — ASSESSMENT & PLAN NOTE
I suspect this gentlemen's shortness of breath exertional chest pain is likely exercise-induced asthma.  I referred him to pulmonary for this as well as further evaluation of the right lower lobe pulmonary nodule (1 cm that was noted on recent CT of the chest from [place].  Copies of the CT have been requested

## 2022-07-15 NOTE — ASSESSMENT & PLAN NOTE
Recommended bulk forming fiber such as Metamucil twice a day.  No straining to stool , referred to colorectal surgery for further evaluation.

## 2022-07-15 NOTE — PROGRESS NOTES
"Vidal Woodson  1990  [id number]  Patient Care Team:  Franklin Gutierrez MD as PCP - General (Internal Medicine)    Vidal Woodson is a 31 y.o. male here today for follow up.     This patient is accompanied by their self who contributes to the history of their care.    Chief Complaint:    Chief Complaint   Patient presents with   • Hospital Follow Up Visit         History of Present Illness:  I have reviewed and/or updated the patient's past medical, past surgical, family, social history, problem list and allergies as appropriate.      Was seen at [facility] for chest pain. Was sent to ER from [place] cardiology. CT performed Negative for PE. There was mention of a RLL pulmonary nodule. Denies fevers chills or night sweats. No known TB exposures.     Denies bp spikes.     Worsening problem  Chest pain occurs with physical activity- typically with running. ( 1.5 mile into run). There is chest tightness- but no audible wheezing.  No history of childhood asthma. ( in high school PE was given MDI prior to sprints with improvement.    New problem  Reports rectal pain with defecation. Tearing pain with defecation. He did see blood one month ago.  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  Its is nighttime bowel movements have become more painful.    This balanitis is improved with fluconazole.  Unfortunately he has not had a chance to see urology.  He continues to nystatin cream.    Review of Systems   Constitutional: Negative.    HENT: Negative.    Respiratory: Positive for chest tightness and shortness of breath.    Gastrointestinal: Positive for rectal pain.   Endocrine: Negative.    Genitourinary:        Penile rash   Neurological: Negative.    Hematological: Negative.    Psychiatric/Behavioral: Negative.        Vitals:    07/15/22 1253   BP: 136/88   Pulse: 82   SpO2: 98%   Weight: (!) 141 kg (310 lb 3.2 oz)   Height: 177.8 cm (70\")     Body mass index is 44.51 " kg/m².    Physical Exam  Vitals and nursing note reviewed.   Constitutional:       General: He is not in acute distress.     Appearance: He is well-developed. He is not diaphoretic.   HENT:      Head: Normocephalic and atraumatic.      Right Ear: External ear normal.      Left Ear: External ear normal.      Mouth/Throat:      Pharynx: No oropharyngeal exudate.   Eyes:      General: No scleral icterus.        Right eye: No discharge.      Conjunctiva/sclera: Conjunctivae normal.   Neck:      Thyroid: No thyromegaly.      Vascular: No JVD.      Trachea: No tracheal deviation.   Cardiovascular:      Rate and Rhythm: Normal rate and regular rhythm.      Heart sounds: Normal heart sounds.      Comments: PMI nondisplaced  Pulmonary:      Effort: Pulmonary effort is normal.      Breath sounds: Normal breath sounds. No wheezing or rales.   Abdominal:      General: Bowel sounds are normal.      Palpations: Abdomen is soft.      Tenderness: There is no abdominal tenderness. There is no guarding or rebound.   Musculoskeletal:      Cervical back: Normal range of motion and neck supple.      Comments: Normal gait   Lymphadenopathy:      Cervical: No cervical adenopathy.   Skin:     General: Skin is warm and dry.      Capillary Refill: Capillary refill takes less than 2 seconds.      Coloration: Skin is not pale.      Findings: No rash.   Neurological:      Mental Status: He is alert and oriented to person, place, and time.      Motor: No abnormal muscle tone.      Coordination: Coordination normal.   Psychiatric:         Judgment: Judgment normal.         Procedures    Results Review:    None    Assessment/Plan:     Problem List Items Addressed This Visit        Cardiac and Vasculature    Other chest pain    Current Assessment & Plan     I suspect this is exercise-induced asthma.  Trial of albuterol 15 minutes prior to physical training.  Pulmonary referral for this pulmonary nodule.              Gastrointestinal Abdominal      Rectal fissure    Current Assessment & Plan     Recommended bulk forming fiber such as Metamucil twice a day.  No straining to stool , referred to colorectal surgery for further evaluation.           Relevant Orders    Ambulatory Referral to Colorectal Surgery (Completed)       Genitourinary and Reproductive     Balanitis    Current Assessment & Plan     Continue fluconazole and nystatin.  Is imperative that he see urology, think he needs a completion of his circumcision.           Relevant Orders    Ambulatory Referral to Urology       Pulmonary and Pneumonias    Shortness of breath - Primary    Current Assessment & Plan     I suspect this gentlemen's shortness of breath exertional chest pain is likely exercise-induced asthma.  I referred him to pulmonary for this as well as further evaluation of the right lower lobe pulmonary nodule (1 cm that was noted on recent CT of the chest from [place].  Copies of the CT have been requested.           Relevant Orders    Ambulatory Referral to Pulmonology    Right lower lobe pulmonary nodule    Current Assessment & Plan     I suspect this gentlemen's shortness of breath exertional chest pain is likely exercise-induced asthma.  I referred him to pulmonary for this as well as further evaluation of the right lower lobe pulmonary nodule (1 cm that was noted on recent CT of the chest from [place].  Copies of the CT have been requested                 Plan of care reviewed with patient at the conclusion of today's visit. Education was provided regarding diagnosis and management.  Patient verbalizes understanding of and agreement with management plan.    Return in about 3 months (around 10/15/2022) for dyspnea.    Franklin Gutierrez MD      Please note than portions of this note were completed [facility] a Voice Recognition Program

## 2022-07-15 NOTE — ASSESSMENT & PLAN NOTE
I suspect this is exercise-induced asthma.  Trial of albuterol 15 minutes prior to physical training.  Pulmonary referral for this pulmonary nodule.

## 2022-07-15 NOTE — ASSESSMENT & PLAN NOTE
Continue fluconazole and nystatin.  Is imperative that he see urology, think he needs a completion of his circumcision.

## 2022-12-15 ENCOUNTER — TELEPHONE (OUTPATIENT)
Dept: FAMILY MEDICINE CLINIC | Facility: CLINIC | Age: 32
End: 2022-12-15

## 2022-12-15 NOTE — TELEPHONE ENCOUNTER
Caller: Vidal Woodson    Relationship: Self    Best call back number: [phone]    What is the medical concern/diagnosis: BALANITIS    What specialty or service is being requested: UROLOGY    What is the provider, practice or medical service name: [facility]    Any additional details: PATIENT PREVIOUS UROLOGIST CANCELLED HIS APPOINTMENT HE'D BEEN WAITING 3 MONTHS FOR, HE WOULD LIKE ANOTHER REFERRAL AND APPT ASAP

## 2022-12-19 DIAGNOSIS — N48.1 BALANITIS: Primary | ICD-10-CM

## 2022-12-27 ENCOUNTER — TELEMEDICINE (OUTPATIENT)
Dept: FAMILY MEDICINE CLINIC | Facility: CLINIC | Age: 32
End: 2022-12-27

## 2022-12-27 DIAGNOSIS — J45.990 EXERCISE-INDUCED ASTHMA: Primary | ICD-10-CM

## 2022-12-27 DIAGNOSIS — N48.1 BALANITIS: ICD-10-CM

## 2022-12-27 PROCEDURE — 99213 OFFICE O/P EST LOW 20 MIN: CPT | Performed by: INTERNAL MEDICINE

## 2022-12-27 NOTE — PROGRESS NOTES
Vidal Woodson  1990  [id number]  Patient Care Team:  Franklin Gutierrez MD as PCP - General (Internal Medicine)    Vidal Woodson is a 32 y.o. male here today for follow up.     This patient is accompanied by their self who contributes to the history of their care.    Chief Complaint:    No chief complaint on file.  Follow-up balanitis    Video visit requested by patient.  He is physically located in [facility].  I myself am located in [facility]    History of Present Illness:  I have reviewed and/or updated the patient's past medical, past surgical, family, social history, problem list and allergies as appropriate.     Diagnosis man with balanitis earlier this past year.  He had seen urology he indicates in August.  Had recurrence repeated antifungal agents were tried.  He recently revisited urology and had a biopsy with findings worrisome for possible penile cancer.  Mentally he is okay with this.  He is biopsies will be recovered this upcoming Friday.    His dyspnea on exertion is improved with preexercise albuterol use.  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    His wife has been bedbound secondary to pregnancy complications for the past several weeks.  All things considered Mr. Woodson is doing well.    Review of Systems   Constitutional: Negative.    HENT: Negative.    Respiratory: Positive for shortness of breath.    Genitourinary: Positive for penile pain.       There were no vitals filed for this visit.  There is no height or weight on file to calculate BMI.    Physical Exam  Vitals and nursing note reviewed.   Constitutional:       General: He is not in acute distress.     Appearance: He is well-developed. He is not diaphoretic.   HENT:      Head: Normocephalic and atraumatic.      Right Ear: External ear normal.      Left Ear: External ear normal.      Mouth/Throat:      Pharynx: No oropharyngeal exudate.   Eyes:       General: No scleral icterus.        Right eye: No discharge.      Conjunctiva/sclera: Conjunctivae normal.   Neck:      Thyroid: No thyromegaly.      Vascular: No JVD.      Trachea: No tracheal deviation.   Cardiovascular:      Comments: PMI nondisplaced  Pulmonary:      Effort: Pulmonary effort is normal. No respiratory distress.   Neurological:      Mental Status: He is alert and oriented to person, place, and time.      Motor: No abnormal muscle tone.      Coordination: Coordination normal.   Psychiatric:         Mood and Affect: Mood normal.         Behavior: Behavior normal.         Judgment: Judgment normal.         Procedures    Results Review:    None    Assessment/Plan:     Problem List Items Addressed This Visit        Genitourinary and Reproductive     Balanitis    Current Assessment & Plan     Recently reevaluated by urology with findings concerning for possible penile carcinoma.  Await biopsy results this Friday.            Pulmonary and Pneumonias    Exercise-induced asthma - Primary    Overview     Proved with as needed albuterol.  Continue as needed         Relevant Medications    albuterol sulfate  (90 Base) MCG/ACT inhaler       Plan of care reviewed with patient at the conclusion of today's visit. Education was provided regarding diagnosis and management.  Patient verbalizes understanding of and agreement with management plan.    No follow-ups on file.    Franklin Gutierrez MD      Please note than portions of this note were completed [facility] a Voice Recognition Program

## 2022-12-27 NOTE — ASSESSMENT & PLAN NOTE
Recently reevaluated by urology with findings concerning for possible penile carcinoma.  Await biopsy results this Friday.

## 2023-05-01 ENCOUNTER — OFFICE VISIT (OUTPATIENT)
Dept: FAMILY MEDICINE CLINIC | Facility: CLINIC | Age: 33
End: 2023-05-01
Payer: COMMERCIAL

## 2023-05-01 VITALS
BODY MASS INDEX: 45.1 KG/M2 | HEART RATE: 96 BPM | SYSTOLIC BLOOD PRESSURE: 150 MMHG | HEIGHT: 70 IN | OXYGEN SATURATION: 96 % | WEIGHT: 315 LBS | DIASTOLIC BLOOD PRESSURE: 110 MMHG

## 2023-05-01 DIAGNOSIS — R73.9 HYPERGLYCEMIA: ICD-10-CM

## 2023-05-01 DIAGNOSIS — G47.33 OSA (OBSTRUCTIVE SLEEP APNEA): ICD-10-CM

## 2023-05-01 DIAGNOSIS — I10 PRIMARY HYPERTENSION: Primary | ICD-10-CM

## 2023-05-01 PROBLEM — N48.89 PENILE MASS: Status: ACTIVE | Noted: 2022-12-23

## 2023-05-01 PROCEDURE — 99214 OFFICE O/P EST MOD 30 MIN: CPT | Performed by: INTERNAL MEDICINE

## 2023-05-01 RX ORDER — BLOOD-GLUCOSE METER
1 KIT MISCELLANEOUS AS NEEDED
Qty: 1 EACH | Refills: 1 | Status: SHIPPED | OUTPATIENT
Start: 2023-05-01

## 2023-05-01 RX ORDER — BENZONATATE 100 MG/1
100 CAPSULE ORAL 3 TIMES DAILY PRN
COMMUNITY

## 2023-05-01 RX ORDER — LISINOPRIL 20 MG/1
20 TABLET ORAL DAILY
Qty: 90 TABLET | Refills: 3 | Status: SHIPPED | OUTPATIENT
Start: 2023-05-01

## 2023-05-01 NOTE — PROGRESS NOTES
Vidal Woodson  1990  [id number]  Patient Care Team:  Franklin Gutierrez MD as PCP - General (Internal Medicine)    Vidal Woodson is a 32 y.o. male here today for follow up.     This patient is accompanied by their self who contributes to the history of their care.    Chief Complaint:    Chief Complaint   Patient presents with   • Hypertension        History of Present Illness:  I have reviewed and/or updated the patient's past medical, past surgical, family, social history, problem list and allergies as appropriate.     Vidal has been undergoing treatment for metastatic testicular cancer.  He has gained approximately 50 pounds.  His blood pressures have been uncontrolled for the past month or so.  He does monitor at other physician's offices.  No chest pain or shortness of breath orthopnea or PND.  He continues on his CPAP however feels his machine is leaking.  He is undergoing inguinal lymph node dissection next week.  All things considered he is doing well.  He and his wife are actively currently watching her macronutrients and portion sizes.  He is resuming physical activity after his lymphadenectomy.    Review of Systems   Constitutional: Positive for fatigue. Negative for chills, fever, unexpected weight gain and unexpected weight loss.   HENT: Negative for ear pain, postnasal drip, sinus pressure and sore throat.    Eyes: Negative for blurred vision, double vision and visual disturbance.   Respiratory: Negative for cough, shortness of breath and wheezing.    Cardiovascular: Negative for chest pain, palpitations and leg swelling.   Gastrointestinal: Negative for abdominal pain, blood in stool, diarrhea, nausea and vomiting.   Endocrine: Negative for cold intolerance, heat intolerance, polydipsia, polyphagia and polyuria.   Genitourinary: Negative for dysuria, flank pain and hematuria.   Musculoskeletal: Negative for arthralgias and joint swelling.   Skin: Negative for dry skin and rash.  "  Neurological: Negative for weakness, numbness and headache.   Psychiatric/Behavioral: Negative for self-injury, suicidal ideas and depressed mood.       Vitals:    05/01/23 1137   BP: (!) 150/110   Pulse: 96   SpO2: 96%   Weight: (!) 167 kg (367 lb 12.8 oz)   Height: 177.8 cm (70\")     Body mass index is 52.77 kg/m².    Physical Exam  Vitals and nursing note reviewed.   Constitutional:       General: He is not in acute distress.     Appearance: He is well-developed. He is not diaphoretic.   HENT:      Head: Normocephalic and atraumatic.      Right Ear: External ear normal.      Left Ear: External ear normal.      Mouth/Throat:      Pharynx: No oropharyngeal exudate.   Eyes:      General: No scleral icterus.        Right eye: No discharge.      Conjunctiva/sclera: Conjunctivae normal.   Neck:      Thyroid: No thyromegaly.      Vascular: No JVD.      Trachea: No tracheal deviation.   Cardiovascular:      Rate and Rhythm: Normal rate and regular rhythm.      Heart sounds: Normal heart sounds.      Comments: PMI nondisplaced  Pulmonary:      Effort: Pulmonary effort is normal.      Breath sounds: Normal breath sounds. No wheezing or rales.   Abdominal:      General: Bowel sounds are normal.      Palpations: Abdomen is soft.      Tenderness: There is no abdominal tenderness. There is no guarding or rebound.   Musculoskeletal:      Cervical back: Normal range of motion and neck supple.   Lymphadenopathy:      Cervical: No cervical adenopathy.   Skin:     General: Skin is warm and dry.      Capillary Refill: Capillary refill takes less than 2 seconds.      Coloration: Skin is not pale.      Findings: No rash.   Neurological:      Mental Status: He is alert and oriented to person, place, and time.      Motor: No abnormal muscle tone.      Coordination: Coordination normal.   Psychiatric:         Mood and Affect: Mood normal.         Behavior: Behavior normal.         Judgment: Judgment normal. "         Procedures    Results Review:    I reviewed the patient's new clinical results.    Assessment/Plan:    Problem List Items Addressed This Visit        Cardiac and Vasculature    Primary hypertension - Primary    Current Assessment & Plan     Hypertension is worsening.  Dietary sodium restriction.  Weight loss.  Regular aerobic exercise.  Ambulatory blood pressure monitoring.  Mediterranean diet, resume exercising when okay with surgeon.  Referral back to sleep evaluation, lisinopril 20 mg daily  Blood pressure will be reassessed in 3 months.  He will continue ambulatory blood pressure monitoring at home.  He will notify if elevated greater than 140 greater than 85.  We will adjust medications remotely while he is continuing his cancer treated         Relevant Medications    lisinopril (PRINIVIL,ZESTRIL) 20 MG tablet       Endocrine and Metabolic    Hyperglycemia   Other Visit Diagnoses     PRICE (obstructive sleep apnea)        Relevant Orders    Ambulatory Referral to Sleep Medicine          Plan of care reviewed with patient at the conclusion of today's visit. Education was provided regarding diagnosis and management.  Patient verbalizes understanding of and agreement with management plan.    Return in about 3 months (around 8/1/2023) for htn.    Franklin Gutierrez MD      Please note than portions of this note were completed [facility] a Voice Recognition Program

## 2023-05-01 NOTE — ASSESSMENT & PLAN NOTE
Hypertension is worsening.  Dietary sodium restriction.  Weight loss.  Regular aerobic exercise.  Ambulatory blood pressure monitoring.  Mediterranean diet, resume exercising when okay with surgeon.  Referral back to sleep evaluation, lisinopril 20 mg daily  Blood pressure will be reassessed in 3 months.  He will continue ambulatory blood pressure monitoring at home.  He will notify if elevated greater than 140 greater than 85.  We will adjust medications remotely while he is continuing his cancer treated

## 2023-08-03 ENCOUNTER — OFFICE VISIT (OUTPATIENT)
Dept: FAMILY MEDICINE CLINIC | Facility: CLINIC | Age: 33
End: 2023-08-03
Payer: COMMERCIAL

## 2023-08-03 VITALS
SYSTOLIC BLOOD PRESSURE: 142 MMHG | HEIGHT: 70 IN | DIASTOLIC BLOOD PRESSURE: 88 MMHG | HEART RATE: 85 BPM | WEIGHT: 315 LBS | OXYGEN SATURATION: 97 % | BODY MASS INDEX: 45.1 KG/M2

## 2023-08-03 DIAGNOSIS — I10 PRIMARY HYPERTENSION: Primary | ICD-10-CM

## 2023-08-03 DIAGNOSIS — G47.33 OSA (OBSTRUCTIVE SLEEP APNEA): ICD-10-CM

## 2023-08-03 DIAGNOSIS — E66.01 MORBID (SEVERE) OBESITY DUE TO EXCESS CALORIES: ICD-10-CM

## 2023-08-03 PROBLEM — C60.9: Status: ACTIVE | Noted: 2022-12-28

## 2023-08-03 PROBLEM — F17.200 TOBACCO USE DISORDER: Status: ACTIVE | Noted: 2023-02-02

## 2023-08-03 RX ORDER — LISINOPRIL 20 MG/1
10 TABLET ORAL DAILY
Qty: 90 TABLET | Refills: 3 | Status: SHIPPED | OUTPATIENT
Start: 2023-08-03

## 2023-12-11 ENCOUNTER — OFFICE VISIT (OUTPATIENT)
Dept: FAMILY MEDICINE CLINIC | Facility: CLINIC | Age: 33
End: 2023-12-11
Payer: COMMERCIAL

## 2023-12-11 VITALS
WEIGHT: 315 LBS | OXYGEN SATURATION: 98 % | DIASTOLIC BLOOD PRESSURE: 78 MMHG | HEART RATE: 82 BPM | BODY MASS INDEX: 45.1 KG/M2 | HEIGHT: 70 IN | SYSTOLIC BLOOD PRESSURE: 138 MMHG

## 2023-12-11 DIAGNOSIS — H92.02 LEFT EAR PAIN: Primary | ICD-10-CM

## 2023-12-11 PROCEDURE — 99214 OFFICE O/P EST MOD 30 MIN: CPT | Performed by: INTERNAL MEDICINE

## 2023-12-11 RX ORDER — AMOXICILLIN AND CLAVULANATE POTASSIUM 875; 125 MG/1; MG/1
1 TABLET, FILM COATED ORAL 2 TIMES DAILY
Qty: 20 TABLET | Refills: 0 | Status: SHIPPED | OUTPATIENT
Start: 2023-12-11

## 2023-12-11 NOTE — PROGRESS NOTES
"Had wisdomChase Giles Woodson  1990  [id number]  Patient Care Team:  Franklin Gutierrez MD as PCP - General (Internal Medicine)    Vidal Giles Woodson is a 33 y.o. male here today for follow up.     This patient is accompanied by their self who contributes to the history of their care.    Chief Complaint:    Chief Complaint   Patient presents with    Ear Fullness     pressure        History of Present Illness:  I have reviewed and/or updated the patient's past medical, past surgical, family, social history, problem list and allergies as appropriate.     This gentleman is here complaining of right ear pain.  Pain can be 8 out of 10.  Had wisdom extractions last week. Has developed right ear  pressure and pain. Eases with tylenol and ibuprofen. Denies fevers or chills. There is no drainage. He has no current sinus pain of pressure. Denies ST or headache.  Continues to chlorhexidine rinse.  Cough from postnasal drainage.  Indicates his preauricular swelling is markedly improved    Review of Systems   Constitutional:  Positive for unexpected weight loss. Negative for chills, diaphoresis and fever.   HENT:  Positive for ear pain. Negative for postnasal drip, sinus pressure, sore throat, swollen glands and tinnitus.    Eyes: Negative.        Vitals:    12/11/23 1025   BP: 138/78   Pulse: 82   SpO2: 98%   Weight: (!) 167 kg (367 lb 3.2 oz)   Height: 177.8 cm (70\")     Body mass index is 52.69 kg/m².    Physical Exam  Vitals and nursing note reviewed.   Constitutional:       General: He is not in acute distress.     Appearance: He is well-developed. He is not diaphoretic.   HENT:      Head: Normocephalic and atraumatic.      Right Ear: External ear normal.      Left Ear: External ear normal.      Ears:      Comments: Tympanic membrane is extremely dull.  Canal is normal.  I cannot appreciate any air-fluid levels however absent light reflex.  There is preauricular fullness slight tenderness to palpation.     " Mouth/Throat:      Pharynx: No oropharyngeal exudate.      Comments: Oral exam is remarkable.  There is no drainage.  No erythema.  No halitosis.  Eyes:      General: No scleral icterus.        Right eye: No discharge.      Conjunctiva/sclera: Conjunctivae normal.   Neck:      Thyroid: No thyromegaly.      Vascular: No JVD.      Trachea: No tracheal deviation.   Cardiovascular:      Rate and Rhythm: Normal rate and regular rhythm.      Heart sounds: Normal heart sounds.      Comments: PMI nondisplaced  Pulmonary:      Effort: Pulmonary effort is normal.      Breath sounds: Normal breath sounds. No wheezing or rales.   Abdominal:      General: Bowel sounds are normal.      Palpations: Abdomen is soft.      Tenderness: There is no abdominal tenderness. There is no guarding or rebound.   Musculoskeletal:      Cervical back: Normal range of motion and neck supple.   Lymphadenopathy:      Cervical: No cervical adenopathy.   Skin:     General: Skin is warm and dry.      Capillary Refill: Capillary refill takes less than 2 seconds.      Coloration: Skin is not pale.      Findings: No rash.   Neurological:      Mental Status: He is alert and oriented to person, place, and time.      Motor: No abnormal muscle tone.      Coordination: Coordination normal.   Psychiatric:         Judgment: Judgment normal.         Procedures    Results Review:    None    Assessment/Plan:    Problem List Items Addressed This Visit       Left ear pain - Primary    Current Assessment & Plan     Suspect this is related to his recent wisdom tooth extraction possible dry socket however given the absent light reflex and ear pain and pressure placed him on Augmentin x 10 days.  Continue ibuprofen and Tylenol ice as needed            Plan of care reviewed with patient at the conclusion of today's visit. Education was provided regarding diagnosis and management.  Patient verbalizes understanding of and agreement with management plan.    Return for Next  scheduled follow up.    Franklin Gutierrez MD      Please note than portions of this note were completed wt a Voice Recognition Program

## 2023-12-11 NOTE — ASSESSMENT & PLAN NOTE
Suspect this is related to his recent wisdom tooth extraction possible dry socket however given the absent light reflex and ear pain and pressure placed him on Augmentin x 10 days.  Continue ibuprofen and Tylenol ice as needed

## 2023-12-14 ENCOUNTER — LAB (OUTPATIENT)
Dept: LAB | Facility: HOSPITAL | Age: 33
End: 2023-12-14
Payer: COMMERCIAL

## 2023-12-14 ENCOUNTER — OFFICE VISIT (OUTPATIENT)
Dept: FAMILY MEDICINE CLINIC | Facility: CLINIC | Age: 33
End: 2023-12-14
Payer: COMMERCIAL

## 2023-12-14 VITALS
OXYGEN SATURATION: 96 % | SYSTOLIC BLOOD PRESSURE: 145 MMHG | HEIGHT: 70 IN | WEIGHT: 315 LBS | HEART RATE: 88 BPM | DIASTOLIC BLOOD PRESSURE: 84 MMHG | BODY MASS INDEX: 45.1 KG/M2

## 2023-12-14 DIAGNOSIS — R00.0 TACHYCARDIA: Primary | ICD-10-CM

## 2023-12-14 DIAGNOSIS — R00.2 PALPITATIONS: ICD-10-CM

## 2023-12-14 DIAGNOSIS — R00.0 TACHYCARDIA: ICD-10-CM

## 2023-12-14 LAB
ALBUMIN SERPL-MCNC: 4.6 G/DL (ref 3.5–5.2)
ALBUMIN/GLOB SERPL: 1.4 G/DL
ALP SERPL-CCNC: 65 U/L (ref 39–117)
ALT SERPL W P-5'-P-CCNC: 31 U/L (ref 1–41)
ANION GAP SERPL CALCULATED.3IONS-SCNC: 10.6 MMOL/L (ref 5–15)
AST SERPL-CCNC: 22 U/L (ref 1–40)
BILIRUB SERPL-MCNC: 0.4 MG/DL (ref 0–1.2)
BUN SERPL-MCNC: 15 MG/DL (ref 6–20)
BUN/CREAT SERPL: 18.1 (ref 7–25)
CALCIUM SPEC-SCNC: 9.6 MG/DL (ref 8.6–10.5)
CHLORIDE SERPL-SCNC: 103 MMOL/L (ref 98–107)
CO2 SERPL-SCNC: 25.4 MMOL/L (ref 22–29)
CREAT SERPL-MCNC: 0.83 MG/DL (ref 0.76–1.27)
DEPRECATED RDW RBC AUTO: 44.2 FL (ref 37–54)
EGFRCR SERPLBLD CKD-EPI 2021: 118.5 ML/MIN/1.73
ERYTHROCYTE [DISTWIDTH] IN BLOOD BY AUTOMATED COUNT: 13.7 % (ref 12.3–15.4)
GLOBULIN UR ELPH-MCNC: 3.2 GM/DL
GLUCOSE SERPL-MCNC: 94 MG/DL (ref 65–99)
HCT VFR BLD AUTO: 41.5 % (ref 37.5–51)
HGB BLD-MCNC: 14.1 G/DL (ref 13–17.7)
MAGNESIUM SERPL-MCNC: 2.2 MG/DL (ref 1.6–2.6)
MCH RBC QN AUTO: 29.9 PG (ref 26.6–33)
MCHC RBC AUTO-ENTMCNC: 34 G/DL (ref 31.5–35.7)
MCV RBC AUTO: 87.9 FL (ref 79–97)
PLATELET # BLD AUTO: 241 10*3/MM3 (ref 140–450)
PMV BLD AUTO: 9 FL (ref 6–12)
POTASSIUM SERPL-SCNC: 4.1 MMOL/L (ref 3.5–5.2)
PROT SERPL-MCNC: 7.8 G/DL (ref 6–8.5)
RBC # BLD AUTO: 4.72 10*6/MM3 (ref 4.14–5.8)
SODIUM SERPL-SCNC: 139 MMOL/L (ref 136–145)
TSH SERPL DL<=0.05 MIU/L-ACNC: 2.19 UIU/ML (ref 0.27–4.2)
WBC NRBC COR # BLD AUTO: 10.31 10*3/MM3 (ref 3.4–10.8)

## 2023-12-14 PROCEDURE — 83735 ASSAY OF MAGNESIUM: CPT

## 2023-12-14 PROCEDURE — 80050 GENERAL HEALTH PANEL: CPT

## 2023-12-14 NOTE — PROGRESS NOTES
Chief Complaint   Patient presents with    Palpitations       HPI:  Vidal Woodson is a 33 y.o. male who presents today for elevated heart rate.    Patient states he woke up in the middle of the night last night because his watch alerted him that his heart rate was high.  Heart rate was noted to be in the 115-120s range at that time. Has noticed higher HR over the last couple of weeks. The elevation persisted throughout this morning which prompted him to call and schedule appt. He denies any chest pain. No dizziness or lightheadedness, SOB. He does admit that he has recently starting drinking more, up to 1/2 fifth of bourbon per night. He also stopped taking his blood pressure medication over the last couple of weeks.     PE:  Vitals:    12/14/23 1337   BP: 145/84   Pulse: 88   SpO2: 96%      Body mass index is 52.52 kg/m².    Gen Appearance: NAD  HEENT: Normocephalic, PERRL, no thyromegaly, trachea midline  Heart: RRR, normal S1 and S2, no murmur  Lungs: CTA b/l, no wheezing, no crackles  MSK: Moves all extremities well, normal gait, no peripheral edema  Pulses: Palpable and equal b/l  Neuro: No focal deficits    Current Outpatient Medications   Medication Sig Dispense Refill    albuterol sulfate  (90 Base) MCG/ACT inhaler Inhale 2 puffs Every 4 (Four) Hours As Needed for Wheezing (15 min prior to exercise). 18 g 3    amoxicillin-clavulanate (AUGMENTIN) 875-125 MG per tablet Take 1 tablet by mouth 2 (Two) Times a Day. 20 tablet 0    glucose blood test strip Use as instructed 50 each 12    glucose monitor monitoring kit 1 each As Needed (hypoglyccemia). 1 each 1    lisinopril (PRINIVIL,ZESTRIL) 20 MG tablet Take 0.5 tablets by mouth Daily. 90 tablet 3     No current facility-administered medications for this visit.        A/P:  Diagnoses and all orders for this visit:    1. Tachycardia (Primary)  -     TSH; Future  -     CBC (No Diff); Future  -     Comprehensive Metabolic Panel; Future  -      Magnesium; Future  -     ECG 12 Lead    2. Palpitations  -     TSH; Future  -     CBC (No Diff); Future  -     Comprehensive Metabolic Panel; Future  -     Magnesium; Future  -     ECG 12 Lead       EKG personally interpreted, no priors are available for comparison. Shows NSR without any ST changes concerning for ischemia. There is evidence of LVH but doubtful this is related to the tachycardia he has been experiencing.    CBC, CMP, TSH and Mg levels obtained and all are WNL. Exam was reassuring in office. I suspect that recent increase in alcohol ingestion is likely contributing.Counseled to limit alcohol intake and stressed importance of compliance with his anti hypertensives as his BP is elevated today and there is evidence of LVH on his EKG. FU if sx's persist.    No follow-ups on file.     Dictated Utilizing Dragon Dictation    Please note that portions of this note were completed with a voice recognition program.    Part of this note may be an electronic transcription/translation of spoken language to printed text using the Dragon Dictation System.

## 2024-01-30 ENCOUNTER — OFFICE VISIT (OUTPATIENT)
Dept: FAMILY MEDICINE CLINIC | Facility: CLINIC | Age: 34
End: 2024-01-30
Payer: COMMERCIAL

## 2024-01-30 VITALS
HEIGHT: 70 IN | DIASTOLIC BLOOD PRESSURE: 84 MMHG | HEART RATE: 83 BPM | SYSTOLIC BLOOD PRESSURE: 136 MMHG | BODY MASS INDEX: 45.1 KG/M2 | WEIGHT: 315 LBS | OXYGEN SATURATION: 97 %

## 2024-01-30 DIAGNOSIS — F41.9 ANXIETY: ICD-10-CM

## 2024-01-30 DIAGNOSIS — F41.0 PANIC ANXIETY SYNDROME: ICD-10-CM

## 2024-01-30 DIAGNOSIS — S22.39XS CLOSED FRACTURE OF ONE RIB, UNSPECIFIED LATERALITY, SEQUELA: Primary | ICD-10-CM

## 2024-01-30 RX ORDER — DEXAMETHASONE 4 MG/1
TABLET ORAL
COMMUNITY
End: 2024-01-30

## 2024-01-30 RX ORDER — METHOCARBAMOL 750 MG/1
TABLET, FILM COATED ORAL
COMMUNITY
End: 2024-01-30

## 2024-01-30 RX ORDER — ACETAMINOPHEN 500 MG
500 TABLET ORAL EVERY 6 HOURS PRN
COMMUNITY
Start: 2024-01-20 | End: 2024-02-03

## 2024-01-30 RX ORDER — ESCITALOPRAM OXALATE 10 MG/1
10 TABLET ORAL DAILY
Qty: 90 TABLET | Refills: 2 | Status: SHIPPED | OUTPATIENT
Start: 2024-01-30

## 2024-01-30 NOTE — ASSESSMENT & PLAN NOTE
He has severe anxiety and PTSD secondary to his metastatic testicular cancer diagnosis.  I think it is finally caught up with him.  Encouraged that he is reached out to his unit and is arranging counseling sessions.  He does do a trial of Lexapro.  Recommended 5 mg daily for 2 weeks and then up to 10 mg daily.  Would revisit 2 to 3 months follow-up progress.

## 2024-01-30 NOTE — PROGRESS NOTES
"Vidal Woodson  1990  [id number]  Patient Care Team:  Franklin Gutierrez MD as PCP - General (Internal Medicine)    Vidal Woodson is a 33 y.o. male here today for follow up.     This patient is accompanied by their self who contributes to the history of their care.    Chief Complaint:    Chief Complaint   Patient presents with    Anxiety    Irregular Heart Beat        History of Present Illness:  I have reviewed and/or updated the patient's past medical, past surgical, family, social history, problem list and allergies as appropriate.     Here to followup in rib injury. Was seen at . He has pain only with sneezing and coughing. He has no pain with lifting no soa. He is willing to see workmans comp.    He has had recurrent anxiety to point of panic. Hyperventilating tachycardia. Needing reassurance form his coworkers and wife.  He has upcoming therapy appointment.  He denies any prior history of anxiety.  He typically has prided himself on being very stoic.  He did survive metastatic lung cancer and he is a .  His unit at the [facility] was able to rally around him and he was able to open up and share with him.  Denies any HI or SI.  Having difficulty sleeping.  He is treated for sleep apnea.  Still awakening at times.  Upcoming evaluation at [facility] sleep center is pending   Review of Systems   Constitutional:  Positive for unexpected weight gain.   Respiratory:  Positive for apnea.    Cardiovascular:  Positive for palpitations.   Endocrine: Negative.    Psychiatric/Behavioral:  Positive for sleep disturbance. The patient is nervous/anxious.        Vitals:    01/30/24 1003   BP: 136/84   Pulse: 83   SpO2: 97%   Weight: (!) 164 kg (360 lb 12.8 oz)   Height: 177.8 cm (70\")     Body mass index is 51.77 kg/m².    Physical Exam  Vitals and nursing note reviewed.   Constitutional:       General: He is not in acute distress.     Appearance: He is well-developed. He is not " diaphoretic.   HENT:      Head: Normocephalic and atraumatic.      Right Ear: External ear normal.      Left Ear: External ear normal.      Mouth/Throat:      Pharynx: No oropharyngeal exudate.   Eyes:      General: No scleral icterus.        Right eye: No discharge.      Conjunctiva/sclera: Conjunctivae normal.   Neck:      Thyroid: No thyromegaly.      Vascular: No JVD.      Trachea: No tracheal deviation.   Cardiovascular:      Rate and Rhythm: Normal rate and regular rhythm.      Heart sounds: Normal heart sounds.      Comments: PMI nondisplaced  Pulmonary:      Effort: Pulmonary effort is normal.      Breath sounds: Normal breath sounds. No wheezing or rales.   Abdominal:      General: Bowel sounds are normal.      Palpations: Abdomen is soft.      Tenderness: There is no abdominal tenderness. There is no guarding or rebound.   Musculoskeletal:      Cervical back: Normal range of motion and neck supple.   Lymphadenopathy:      Cervical: No cervical adenopathy.   Skin:     General: Skin is warm and dry.      Capillary Refill: Capillary refill takes less than 2 seconds.      Coloration: Skin is not pale.      Findings: No rash.   Neurological:      Mental Status: He is alert and oriented to person, place, and time.      Motor: No abnormal muscle tone.      Coordination: Coordination normal.   Psychiatric:         Judgment: Judgment normal.         Procedures    Results Review:    I reviewed the patient's new clinical results.    Assessment/Plan:    Problem List Items Addressed This Visit       Anxiety    Current Assessment & Plan     He has severe anxiety and PTSD secondary to his metastatic testicular cancer diagnosis.  I think it is finally caught up with him.  Encouraged that he is reached out to his unit and is arranging counseling sessions.  He does do a trial of Lexapro.  Recommended 5 mg daily for 2 weeks and then up to 10 mg daily.  Would revisit 2 to 3 months follow-up progress.         Panic anxiety  syndrome     Other Visit Diagnoses       Closed fracture of one rib, unspecified laterality, sequela    -  Primary    Relevant Orders    Ambulatory Referral to Occupational Medicine        Also been referred to occupational medicine for clearance to return for force.    Plan of care reviewed with patient at the conclusion of today's visit. Education was provided regarding diagnosis and management.  Patient verbalizes understanding of and agreement with management plan.    Return in about 6 weeks (around 3/12/2024) for anxiety.    Franklin Gutierrez MD      Please note than portions of this note were completed wt a Voice Recognition Program

## 2024-02-29 RX ORDER — ALBUTEROL SULFATE 90 UG/1
2 AEROSOL, METERED RESPIRATORY (INHALATION) EVERY 4 HOURS PRN
Qty: 18 G | Refills: 3 | Status: SHIPPED | OUTPATIENT
Start: 2024-02-29

## 2024-03-12 ENCOUNTER — OFFICE VISIT (OUTPATIENT)
Dept: FAMILY MEDICINE CLINIC | Facility: CLINIC | Age: 34
End: 2024-03-12
Payer: COMMERCIAL

## 2024-03-12 VITALS
WEIGHT: 252 LBS | BODY MASS INDEX: 36.08 KG/M2 | HEART RATE: 77 BPM | DIASTOLIC BLOOD PRESSURE: 82 MMHG | SYSTOLIC BLOOD PRESSURE: 134 MMHG | OXYGEN SATURATION: 98 % | HEIGHT: 70 IN

## 2024-03-12 DIAGNOSIS — J30.1 ALLERGIC RHINITIS DUE TO POLLEN, UNSPECIFIED SEASONALITY: ICD-10-CM

## 2024-03-12 DIAGNOSIS — L91.8 SKIN TAGS, MULTIPLE ACQUIRED: ICD-10-CM

## 2024-03-12 DIAGNOSIS — F41.9 ANXIETY: ICD-10-CM

## 2024-03-12 DIAGNOSIS — I10 PRIMARY HYPERTENSION: ICD-10-CM

## 2024-03-12 DIAGNOSIS — K76.0 NAFL (NONALCOHOLIC FATTY LIVER): Primary | ICD-10-CM

## 2024-03-12 PROBLEM — J30.9 ALLERGIC RHINITIS: Status: ACTIVE | Noted: 2024-03-12

## 2024-03-12 PROCEDURE — 99214 OFFICE O/P EST MOD 30 MIN: CPT | Performed by: INTERNAL MEDICINE

## 2024-03-12 RX ORDER — LISINOPRIL 20 MG/1
10 TABLET ORAL DAILY
Qty: 90 TABLET | Refills: 3 | Status: SHIPPED | OUTPATIENT
Start: 2024-03-12

## 2024-03-12 RX ORDER — LORATADINE 10 MG/1
10 TABLET ORAL DAILY
Qty: 90 TABLET | Refills: 1 | Status: SHIPPED | OUTPATIENT
Start: 2024-03-12

## 2024-03-12 RX ORDER — FLUTICASONE PROPIONATE 50 MCG
2 SPRAY, SUSPENSION (ML) NASAL DAILY
Qty: 5 ML | Refills: 6 | Status: SHIPPED | OUTPATIENT
Start: 2024-03-12

## 2024-03-12 NOTE — ASSESSMENT & PLAN NOTE
He has ceased alcohol consumption over 2 months ago.  He is working on exercise.  I provided the Mediterranean diet, you be working with a nutritionist.  Information on fatty liver provided.

## 2024-03-12 NOTE — ASSESSMENT & PLAN NOTE
Currently improved on the Lexapro 10 mg daily.  Will consider increasing to 20 mg.  Patient will contemplate this.

## 2024-03-12 NOTE — PROGRESS NOTES
"Vidal Woodson  1990  [id number]  Patient Care Team:  Franklin Gutierrez MD as PCP - General (Internal Medicine)    Vidal Woodson is a 33 y.o. male here today for follow up.     This patient is accompanied by their self who contributes to the history of their care.    Chief Complaint:    Chief Complaint   Patient presents with    Anxiety     Doing better    fatty liver    Sinus Problem        History of Present Illness:  I have reviewed and/or updated the patient's past medical, past surgical, family, social history, problem list and allergies as appropriate.     At last visit he agreed to be placed on Lexapro 10 mg daily for severe anxiety.  He has tolerated this with improvement in anxiety. He still have moments of anxiety, but he has found himself to be far less reactive.   As part of his oncologic diagnosis, CT was showed evidence of fatty liver. He denies prutiis skin/scleral changes. Drinks diet pop. , Is meeting wit nutritionist soon. Is exercising. Typically does not eat out.     Intermittent sinsus/nasal congestions and ear pressure. Denies fevers/chills. Sx worse since having chemo.  No fevers or chills or purulent discharge.    Blood pressure remains controlled on lisinopril 20 mg daily.  Denies any orthostatic changes.    It is physical he would like testosterone levels checked.  He also is having irritated skin tags on his back and his perineal region.    Review of Systems   Constitutional:  Negative for fatigue.   HENT:  Positive for ear pain, rhinorrhea and sinus pressure.    Respiratory: Negative.     Cardiovascular: Negative.    Gastrointestinal: Negative.    Skin:  Positive for skin lesions.        Skin tags back and perineum   Psychiatric/Behavioral:  Negative for sleep disturbance.        Vitals:    03/12/24 1030   BP: 134/82   Pulse: 77   SpO2: 98%   Weight: 114 kg (252 lb)   Height: 177.8 cm (70\")     Body mass index is 36.16 kg/m².    Physical Exam  Vitals and nursing note " reviewed.   Constitutional:       General: He is not in acute distress.     Appearance: He is well-developed. He is not diaphoretic.   HENT:      Head: Normocephalic and atraumatic.      Right Ear: External ear normal.      Left Ear: External ear normal.      Mouth/Throat:      Pharynx: No oropharyngeal exudate.   Eyes:      General: No scleral icterus.        Right eye: No discharge.      Conjunctiva/sclera: Conjunctivae normal.   Neck:      Thyroid: No thyromegaly.      Vascular: No JVD.      Trachea: No tracheal deviation.   Cardiovascular:      Rate and Rhythm: Normal rate and regular rhythm.      Heart sounds: Normal heart sounds.      Comments: PMI nondisplaced  Pulmonary:      Effort: Pulmonary effort is normal.      Breath sounds: Normal breath sounds. No wheezing or rales.   Abdominal:      General: Bowel sounds are normal.      Palpations: Abdomen is soft.      Tenderness: There is no abdominal tenderness. There is no guarding or rebound.   Musculoskeletal:      Cervical back: Normal range of motion and neck supple.   Lymphadenopathy:      Cervical: No cervical adenopathy.   Skin:     General: Skin is warm and dry.      Capillary Refill: Capillary refill takes less than 2 seconds.      Coloration: Skin is not pale.      Findings: No rash.   Neurological:      Mental Status: He is alert and oriented to person, place, and time.      Motor: No abnormal muscle tone.      Coordination: Coordination normal.   Psychiatric:         Judgment: Judgment normal.         Procedures    Results Review:    None    Assessment/Plan:    Problem List Items Addressed This Visit       Primary hypertension    Relevant Medications    lisinopril (PRINIVIL,ZESTRIL) 20 MG tablet    Anxiety    Current Assessment & Plan     Currently improved on the Lexapro 10 mg daily.  Will consider increasing to 20 mg.  Patient will contemplate this.         NAFL (nonalcoholic fatty liver) - Primary    Current Assessment & Plan     He has ceased  alcohol consumption over 2 months ago.  He is working on exercise.  I provided the Mediterranean diet, you be working with a nutritionist.  Information on fatty liver provided.         Allergic rhinitis    Relevant Medications    fluticasone (FLONASE) 50 MCG/ACT nasal spray    loratadine (Claritin) 10 MG tablet    Skin tags, multiple acquired    Current Assessment & Plan     Derm referral for removal.         Relevant Orders    Ambulatory Referral to Dermatology       Plan of care reviewed with patient at the conclusion of today's visit. Education was provided regarding diagnosis and management.  Patient verbalizes understanding of and agreement with management plan.    Return in about 3 months (around 6/12/2024) for Annual, fatty iver, anxiet, htn.    Franklin Gutierrez MD      Please note than portions of this note were completed wt a Voice Recognition Program

## 2024-03-27 ENCOUNTER — TRANSCRIBE ORDERS (OUTPATIENT)
Dept: PHYSICAL THERAPY | Facility: CLINIC | Age: 34
End: 2024-03-27
Payer: COMMERCIAL

## 2024-03-27 DIAGNOSIS — S86.912D STRAIN OF LEFT KNEE, SUBSEQUENT ENCOUNTER: Primary | ICD-10-CM

## 2024-04-03 ENCOUNTER — TREATMENT (OUTPATIENT)
Dept: PHYSICAL THERAPY | Facility: CLINIC | Age: 34
End: 2024-04-03
Payer: OTHER MISCELLANEOUS

## 2024-04-03 DIAGNOSIS — M25.562 ACUTE PAIN OF LEFT KNEE: Primary | ICD-10-CM

## 2024-04-03 PROCEDURE — 97112 NEUROMUSCULAR REEDUCATION: CPT | Performed by: PHYSICAL THERAPIST

## 2024-04-03 PROCEDURE — 97161 PT EVAL LOW COMPLEX 20 MIN: CPT | Performed by: PHYSICAL THERAPIST

## 2024-04-03 PROCEDURE — 97110 THERAPEUTIC EXERCISES: CPT | Performed by: PHYSICAL THERAPIST

## 2024-04-03 NOTE — PROGRESS NOTES
Physical Therapy Initial Evaluation and Plan of Care             [address]    [place] [address]    Patient: Vidal Woodson   : 1990  Diagnosis/ICD-10 Code:  Acute pain of left knee [M25.562]  Referring practitioner: TASIA Baumann  Date of Initial Visit: 4/3/2024  Today's Date: 4/3/2024  Patient seen for 1 session         Visit Diagnoses:    ICD-10-CM ICD-9-CM   1. Acute pain of left knee  M25.562 719.46         Subjective Questionnaire: LEFS: 62/80      Subjective Evaluation    History of Present Illness  Date of onset: 3/20/2024  Mechanism of injury: Was playing basketball w/ coworkers. His foot landed on top of another yuri's foot and ankle rolled. No ankle pn but felt pop in the L knee. Had swelling and pn the next day. Initially had pn w/ movement of the knee, walking, stairs. Symptoms much improved. Still uncomfortable when coming down stairs. Able to navigate reciprocally but feels a little weak, unstable. Doesn't feel like he can trust it fully yet. Reports mild discomfort below knee cap, along lateral joint line, and at times extending up lateral thigh. Hasn't attempted squatting, kneeling-afraid it would hurt. Denies popping, clicking, locking. No current swelling.    Hx 2 meniscus tears R knee. Scopes , -recovered well. Not taking medication, not using ice/heat, wearing neoprene. No imaging indicated.    Has hx of penile CA, s/p total panectomy, chemo, B inguinal lypmh node resection-some residual numbness and BLE lymphedema    F/u w/ Occ med     Subjective comment: L knee pn  Patient Occupation:    Precautions and Work Restrictions: avoid heavy lifting, squattingQuality of life: good    Pain  Current pain ratin  At best pain ratin  At worst pain ratin    Social Support  Lives with: wife and 3 kids (7, 5, 1), 3 dogs.    Patient Goals  Patient goals for therapy: decreased pain        Treatment  See Exercise, Manual, and Modality  Logs for complete treatment.     Access Code: [email or web address]  URL: [email or web address]  Date: 04/03/2024  Prepared by: Haley Tejada    Exercises  - Supine Bridge  - 2 x daily - 15-30 reps  - Small Range Straight Leg Raise  - 2 x daily - 15-30 reps  - Sidelying Hip Abduction  - 2 x daily - 15-30 reps  - Single Leg Stance on Foam Pad  - 2 x daily - 3 reps - 30-60 sec hold  - Mini Squat  - 2 x daily - 15-30 reps    Objective          Observations   Left Knee   Negative for deformity and edema.       Tenderness     Additional Tenderness Details  (-) TTP L knee    Active Range of Motion   Left Knee   Flexion: 120 degrees   Extension: 0 degrees     Right Knee   Flexion: 120 degrees   Extension: 0 degrees     Strength/Myotome Testing     Left Knee   Flexion: 5  Extension: 5  Quadriceps contraction: good    Tests     Left Knee   Negative anterior drawer, lateral Bryson (subj clicking-not palpable and non painful), medial Bryson, posterior drawer, Thessaly's test at 20 degrees, valgus stress test at 0 degrees, valgus stress test at 30 degrees, varus stress test at 0 degrees and varus stress test at 30 degrees.     Additional Tests Details  DL support bridge-mild discomfort/tension along lateral joint line  Squat-spprehensive, able to perform half squat w/ cues for technique w/o pn    Ambulation     Observational Gait     Additional Observational Gait Details  Gait mechanics WFL  Navigated 4 stairs reciprocally w/o apparent deviation, no reported pn  SLS on level ground/airex symmetrical to contralateral side, no reported pn or instability    Functional Assessment     Comments                Assessment & Plan       Assessment  Impairments: activity intolerance, impaired physical strength, lacks appropriate home exercise program and pain with function   Functional limitations: walking, uncomfortable because of pain, standing and stooping   Assessment details: Pt is a 33 YOM who presents to PT after work injury sustained  on 3/20/24 resulting in L knee pn. Describes twisting mechanism. Symptoms improved over past couple of weeks, but pt reports ongoing discomfort, weakness w/ stairs, squatting, kneeling, prolonged standing/walking, performing full work duties as a . Signs/symptoms consistent w/ low grade lateral knee sprain/meniscus injury. L knee ROM, quad/hamstring strength intact. (-) TTP. Stable to cruciate/collateral ligament stress tests. Questionable clicking w/ lateral Bryson but non painful. Gait/stair mechanics functional. Recovering well but would benefit from skilled PT services to restore full functional strength, pn free mobility, and function in order to allow return to full work and daily activities w/o limitation.  Barriers to therapy: n/a  Prognosis: good    Goals  Plan Goals: LTG 6 wks  1) Pt to be compliant w/ initial HEP for ROM, strength, and symptom mgmt.  2) Pt to report no perceived instability w/ stairs.  3) Pt to tolerate squatting, kneeling w/o pn or compensation to improve tolerance to work duties.  4) Pt to improve LEFS score to 70/80 or better to reflect improved pn and function.    Plan  Therapy options: will be seen for skilled therapy services  Planned modality interventions: cryotherapy, TENS, thermotherapy (hydrocollator packs) and ultrasound  Planned therapy interventions: balance/weight-bearing training, flexibility, functional ROM exercises, gait training, home exercise program, joint mobilization, manual therapy, neuromuscular re-education, soft tissue mobilization, strengthening, stretching, therapeutic activities and transfer training  Frequency: 1x week  Duration in weeks: 8  Treatment plan discussed with: patient  Plan details: TE/TA/NMR/MT to address noted deficits, modalities as indicated for pn control        History # of Personal Factors and/or Comorbidities: LOW (0)  Examination of Body System(s): # of elements: LOW (1-2)  Clinical Presentation: EVOLVING  Clinical Decision  Making: LOW       Timed:         Manual Therapy:    0     mins  38225;     Therapeutic Exercise:    10     mins  64873;     Neuromuscular Isabelle:    8    mins  08539;    Therapeutic Activity:     0     mins  15228;     Gait Trainin     mins  11466;     Ultrasound:     0     mins  41353;    Ionto                               0    mins   16992  Self Care                       0     mins   92595  Canalith Repos    0     mins 93988      Un-Timed:  Electrical Stimulation:    0     mins  23194 ( );  Dry Needling     0     mins self-pay  Traction     0     mins 45789  Low Eval     30     Mins  05894  Mod Eval     0     Mins  80474  High Eval                       0     Mins  70795        Timed Treatment:   18   mins   Total Treatment:     48   mins          PT: Haley Tejada PT     [place] License: #[id number]    Electronically signed by Haley Tejada PT, 24, 9:30 AM EDT    Certification Period: 4/3/2024 thru 2024  I certify that the therapy services are furnished while this patient is under my care.  The services outlined above are required by this patient, and will be reviewed every 90 days.         Physician Signature:__________________________________________________    PHYSICIAN: Danna Lowe APRN      DATE:     Please sign and return via fax to [phone]. Thank you, [facility].

## 2024-04-09 ENCOUNTER — TREATMENT (OUTPATIENT)
Dept: PHYSICAL THERAPY | Facility: CLINIC | Age: 34
End: 2024-04-09
Payer: OTHER MISCELLANEOUS

## 2024-04-09 DIAGNOSIS — M25.562 ACUTE PAIN OF LEFT KNEE: Primary | ICD-10-CM

## 2024-04-09 PROCEDURE — 97530 THERAPEUTIC ACTIVITIES: CPT | Performed by: PHYSICAL THERAPIST

## 2024-04-09 PROCEDURE — 97112 NEUROMUSCULAR REEDUCATION: CPT | Performed by: PHYSICAL THERAPIST

## 2024-04-09 PROCEDURE — 97110 THERAPEUTIC EXERCISES: CPT | Performed by: PHYSICAL THERAPIST

## 2024-04-09 NOTE — PROGRESS NOTES
"                          Physical Therapy Daily Treatment Note                  [address]  [place] [address]      Patient: Vidal Woodson   : 1990  Diagnosis/ICD-10 Code:  Acute pain of left knee [M25.562]  Referring practitioner: TASIA Baumann  Date of Initial Visit: Type: THERAPY  Noted: 4/3/2024  Today's Date: 2024  Patient seen for 2 sessions             Subjective   Vidal Woodson reports: had to do ladders in full gear yesterday and complete kneeling on the ground. The ladder causes quick fatigue but kneeling is very painful.     Objective          Tenderness   Left Knee   Tenderness in the lateral joint line and patellar tendon.       See Exercise, Manual, and Modality Logs for complete treatment.       Assessment/Plan  Went over stretches of the L LE to focus on after heavy days of activity due to increase in tightness. Progressed strengthening to challenge the left knee pain free. Pt continued with \"tight\" feeling in the knee post treatment and was encouraged to ice and stretch through out the day.   Progress per Plan of Care and Progress strengthening /stabilization /functional activity           Timed:  Manual Therapy:         mins  12622;  Therapeutic Exercise:    23     mins  78094;     Neuromuscular Isabelle:    9    mins  04654;    Therapeutic Activity:     8     mins  05741;     Gait Training:           mins  89214;     Ultrasound:          mins  11734;    Electrical Stimulation:         mins  38961;  Iontophoresis          mins  76562    Untimed:  Electrical Stimulation:         mins  61116 ( );  Mechanical Traction:         mins  84883;   Fluidotherapy          mins  39354    Timed Treatment:   40   mins   Total Treatment:     40   mins        Haley Browne PTA  Physical Therapist Assistant  "

## 2024-06-13 ENCOUNTER — OFFICE VISIT (OUTPATIENT)
Dept: FAMILY MEDICINE CLINIC | Facility: CLINIC | Age: 34
End: 2024-06-13
Payer: COMMERCIAL

## 2024-06-13 ENCOUNTER — LAB (OUTPATIENT)
Dept: LAB | Facility: HOSPITAL | Age: 34
End: 2024-06-13
Payer: COMMERCIAL

## 2024-06-13 VITALS
DIASTOLIC BLOOD PRESSURE: 80 MMHG | HEART RATE: 76 BPM | BODY MASS INDEX: 45.1 KG/M2 | SYSTOLIC BLOOD PRESSURE: 132 MMHG | HEIGHT: 70 IN | WEIGHT: 315 LBS | OXYGEN SATURATION: 97 %

## 2024-06-13 DIAGNOSIS — R53.83 OTHER FATIGUE: ICD-10-CM

## 2024-06-13 DIAGNOSIS — J30.1 ALLERGIC RHINITIS DUE TO POLLEN, UNSPECIFIED SEASONALITY: ICD-10-CM

## 2024-06-13 DIAGNOSIS — G47.33 OSA (OBSTRUCTIVE SLEEP APNEA): Primary | ICD-10-CM

## 2024-06-13 DIAGNOSIS — K76.0 NAFL (NONALCOHOLIC FATTY LIVER): ICD-10-CM

## 2024-06-13 DIAGNOSIS — I10 PRIMARY HYPERTENSION: ICD-10-CM

## 2024-06-13 DIAGNOSIS — G47.33 OSA (OBSTRUCTIVE SLEEP APNEA): ICD-10-CM

## 2024-06-13 DIAGNOSIS — E66.01 MORBID (SEVERE) OBESITY DUE TO EXCESS CALORIES: ICD-10-CM

## 2024-06-13 DIAGNOSIS — F41.9 ANXIETY: ICD-10-CM

## 2024-06-13 PROBLEM — Z00.00 ANNUAL PHYSICAL EXAM: Status: ACTIVE | Noted: 2022-06-08

## 2024-06-13 PROBLEM — J31.0 CHRONIC RHINITIS: Status: ACTIVE | Noted: 2024-06-13

## 2024-06-13 LAB
ALBUMIN SERPL-MCNC: 4 G/DL (ref 3.5–5.2)
ALBUMIN/GLOB SERPL: 1.3 G/DL
ALP SERPL-CCNC: 63 U/L (ref 39–117)
ALT SERPL W P-5'-P-CCNC: 27 U/L (ref 1–41)
ANION GAP SERPL CALCULATED.3IONS-SCNC: 11.7 MMOL/L (ref 5–15)
AST SERPL-CCNC: 15 U/L (ref 1–40)
BILIRUB SERPL-MCNC: 0.4 MG/DL (ref 0–1.2)
BUN SERPL-MCNC: 15 MG/DL (ref 6–20)
BUN/CREAT SERPL: 17.2 (ref 7–25)
CALCIUM SPEC-SCNC: 9.3 MG/DL (ref 8.6–10.5)
CHLORIDE SERPL-SCNC: 103 MMOL/L (ref 98–107)
CHOLEST SERPL-MCNC: 118 MG/DL (ref 0–200)
CO2 SERPL-SCNC: 24.3 MMOL/L (ref 22–29)
CREAT SERPL-MCNC: 0.87 MG/DL (ref 0.76–1.27)
DEPRECATED RDW RBC AUTO: 37.6 FL (ref 37–54)
EGFRCR SERPLBLD CKD-EPI 2021: 116.8 ML/MIN/1.73
ERYTHROCYTE [DISTWIDTH] IN BLOOD BY AUTOMATED COUNT: 12.4 % (ref 12.3–15.4)
GLOBULIN UR ELPH-MCNC: 3.2 GM/DL
GLUCOSE SERPL-MCNC: 95 MG/DL (ref 65–99)
HBA1C MFR BLD: 5.5 % (ref 4.8–5.6)
HCT VFR BLD AUTO: 40.2 % (ref 37.5–51)
HDLC SERPL-MCNC: 36 MG/DL (ref 40–60)
HGB BLD-MCNC: 13.6 G/DL (ref 13–17.7)
LDLC SERPL CALC-MCNC: 67 MG/DL (ref 0–100)
LDLC/HDLC SERPL: 1.87 {RATIO}
MCH RBC QN AUTO: 28.7 PG (ref 26.6–33)
MCHC RBC AUTO-ENTMCNC: 33.8 G/DL (ref 31.5–35.7)
MCV RBC AUTO: 84.8 FL (ref 79–97)
PLATELET # BLD AUTO: 259 10*3/MM3 (ref 140–450)
PMV BLD AUTO: 8.6 FL (ref 6–12)
POTASSIUM SERPL-SCNC: 4.2 MMOL/L (ref 3.5–5.2)
PROT SERPL-MCNC: 7.2 G/DL (ref 6–8.5)
RBC # BLD AUTO: 4.74 10*6/MM3 (ref 4.14–5.8)
SODIUM SERPL-SCNC: 139 MMOL/L (ref 136–145)
T4 FREE SERPL-MCNC: 1.19 NG/DL (ref 0.92–1.68)
TESTOST SERPL-MCNC: 400 NG/DL (ref 249–836)
TRIGL SERPL-MCNC: 73 MG/DL (ref 0–150)
TSH SERPL DL<=0.05 MIU/L-ACNC: 4.21 UIU/ML (ref 0.27–4.2)
VLDLC SERPL-MCNC: 15 MG/DL (ref 5–40)
WBC NRBC COR # BLD AUTO: 9.8 10*3/MM3 (ref 3.4–10.8)

## 2024-06-13 PROCEDURE — 80061 LIPID PANEL: CPT

## 2024-06-13 PROCEDURE — 83036 HEMOGLOBIN GLYCOSYLATED A1C: CPT

## 2024-06-13 PROCEDURE — 80050 GENERAL HEALTH PANEL: CPT

## 2024-06-13 PROCEDURE — 84439 ASSAY OF FREE THYROXINE: CPT

## 2024-06-13 PROCEDURE — 99214 OFFICE O/P EST MOD 30 MIN: CPT | Performed by: INTERNAL MEDICINE

## 2024-06-13 PROCEDURE — 36415 COLL VENOUS BLD VENIPUNCTURE: CPT

## 2024-06-13 PROCEDURE — 84403 ASSAY OF TOTAL TESTOSTERONE: CPT

## 2024-06-13 PROCEDURE — 99395 PREV VISIT EST AGE 18-39: CPT | Performed by: INTERNAL MEDICINE

## 2024-06-13 RX ORDER — MONTELUKAST SODIUM 10 MG/1
10 TABLET ORAL NIGHTLY
Qty: 90 TABLET | Refills: 2 | Status: SHIPPED | OUTPATIENT
Start: 2024-06-13

## 2024-06-13 NOTE — ASSESSMENT & PLAN NOTE
Patient's (Body mass index is 51.22 kg/m².) indicates that they are obese (BMI >30) with health conditions that include obstructive sleep apnea, hypertension, and NAFLD  . Weight is unchanged. BMI  is above average; BMI management plan is completed. We discussed low calorie, low carb based diet program, portion control, increasing exercise, and joining a fitness center or start home based exercise program. Recommend nutritional counseling and Mediterranean diet. Per  min aerobic physical activity weekly  Consider phentermine or Wegovy

## 2024-06-13 NOTE — PROGRESS NOTES
Vidal Woodson  1990  [id number]  Patient Care Team:  Franklin Gutierrez MD as PCP - General (Internal Medicine)    Vidal Woodson is a 33 y.o. male who is here today for his annual physical   This patient is accompanied by his self who contributes to the history of his care.    Chief Complaint:    Chief Complaint   Patient presents with    Annual Exam    Shoulder Pain     BOTH SHOULDERS. Going to P.T    Nasal Congestion     Talk to ENT        History of Present Illness:   Here for annual exam and ongoing shoulder pain- has been been referred to PT through work. No consisitent neck pain . He is hands will go numb driving,  strength has diminished. He denies any weakness  numbness or tingling in arms. He denies any trauma to shoulder or neck. His left is worse. Has not started PT. His fore training wa able to be completed without difficulty. He will have pins and needles sensations upon awakeing.    He is taking a nasal spray 6 times per day, continues on clartian and flonase with ongoing congestion. He contiues to use his inhaler before activity. Has not tried singulair.    Coontiues psych counselling, has stopped ( accidentally) his lexapro, has been busty and missed all of his meds.    He is fasting for blood work.  Despite using CPAP he remains fatigued.  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  He is asking about a repeat study.  Not rested.    Past Medical History:   Diagnosis Date    Dyspnea on exertion     History of palpitations     Other chest pain     Primary central sleep apnea     Primary hypertension     Vision problem        Past Surgical History:   Procedure Laterality Date    KNEE SURGERY      KNEE SURGERY Right 01/01/2008    & 2020        Family History   Problem Relation Age of Onset    Drug abuse Mother     Stroke Maternal Grandmother     Diabetes Maternal Grandfather        Social History     Socioeconomic  "History    Marital status:    Tobacco Use    Smoking status: Former     Current packs/day: 0.00     Average packs/day: 0.3 packs/day for 1 year (0.3 ttl pk-yrs)     Types: Cigarettes     Start date: 2010     Quit date: 2011     Years since quittin.4    Smokeless tobacco: Current   Vaping Use    Vaping status: Never Used   Substance and Sexual Activity    Alcohol use: Not Currently     Alcohol/week: 22.0 standard drinks of alcohol     Types: 12 Cans of beer, 10 Shots of liquor per week     Comment: socially    Drug use: No    Sexual activity: Yes     Partners: Female       No Known Allergies    Depression: PHQ-2 Depression Screening  Little interest or pleasure in doing things? 0-->not at all   Feeling down, depressed, or hopeless? 0-->not at all   PHQ-2 Total Score 0      Immunization History   Administered Date(s) Administered    -influenza Vac Quardvalent Preservativ 2013    COVID-19 (PFIZER) Purple Cap Monovalent 2021, 2022    Hepatitis A 10/31/2018, 2019    Influenza Quad Vaccine (Inpatient) 2013    Tdap 2022       Review of Systems:    Review of Systems   Constitutional:  Positive for fatigue.   HENT:  Positive for postnasal drip.    Respiratory:  Positive for apnea and cough.    Cardiovascular: Negative.    Gastrointestinal: Negative.    Musculoskeletal:  Positive for arthralgias.   Neurological:  Positive for numbness.   Hematological: Negative.    Psychiatric/Behavioral: Negative.         Vitals:    24 0814   BP: 132/80   Pulse: 76   SpO2: 97%   Weight: (!) 162 kg (357 lb)   Height: 177.8 cm (70\")     Body mass index is 51.22 kg/m².      Current Outpatient Medications:     albuterol sulfate  (90 Base) MCG/ACT inhaler, Inhale 2 puffs Every 4 (Four) Hours As Needed for Wheezing (15 min prior to exercise)., Disp: 18 g, Rfl: 3    escitalopram (Lexapro) 10 MG tablet, Take 1 tablet by mouth Daily., Disp: 90 tablet, Rfl: 2    fluticasone " (FLONASE) 50 MCG/ACT nasal spray, 2 sprays into the nostril(s) as directed by provider Daily., Disp: 5 mL, Rfl: 6    glucose blood test strip, Use as instructed, Disp: 50 each, Rfl: 12    glucose monitor monitoring kit, 1 each As Needed (hypoglyccemia)., Disp: 1 each, Rfl: 1    lisinopril (PRINIVIL,ZESTRIL) 20 MG tablet, Take 0.5 tablets by mouth Daily., Disp: 90 tablet, Rfl: 3    loratadine (Claritin) 10 MG tablet, Take 1 tablet by mouth Daily., Disp: 90 tablet, Rfl: 1    montelukast (Singulair) 10 MG tablet, Take 1 tablet by mouth Every Night., Disp: 90 tablet, Rfl: 2    Physical Exam:    Physical Exam  Vitals and nursing note reviewed.   Constitutional:       General: He is not in acute distress.     Appearance: He is well-developed. He is not diaphoretic.   HENT:      Head: Normocephalic and atraumatic.      Right Ear: External ear normal.      Left Ear: External ear normal.      Mouth/Throat:      Pharynx: No oropharyngeal exudate.      Comments: Class 4 airway;  copius pnd  Eyes:      General: No scleral icterus.        Right eye: No discharge.      Conjunctiva/sclera: Conjunctivae normal.   Neck:      Thyroid: No thyromegaly.      Vascular: No JVD.      Trachea: No tracheal deviation.   Cardiovascular:      Rate and Rhythm: Normal rate and regular rhythm.      Heart sounds: Normal heart sounds.      Comments: PMI nondisplaced  Pulmonary:      Effort: Pulmonary effort is normal.      Breath sounds: Normal breath sounds. No wheezing or rales.   Abdominal:      General: Bowel sounds are normal.      Palpations: Abdomen is soft.      Tenderness: There is no abdominal tenderness. There is no guarding or rebound.   Musculoskeletal:      Cervical back: Normal range of motion and neck supple.      Comments: Tinel's sign negative.   strength normal.  Interosseous strength appears normal.  Neck is full range of motion on flexion extension strength is normal.  Range of motion of the shoulders is full without pain.    Lymphadenopathy:      Cervical: No cervical adenopathy.   Skin:     General: Skin is warm and dry.      Capillary Refill: Capillary refill takes less than 2 seconds.      Coloration: Skin is not pale.      Findings: No rash.   Neurological:      Mental Status: He is alert and oriented to person, place, and time.      Motor: No abnormal muscle tone.      Coordination: Coordination normal.   Psychiatric:         Mood and Affect: Mood normal.         Behavior: Behavior normal.         Judgment: Judgment normal.         Procedures    Results Review:    I reviewed the patient's new clinical results.    Assessment/Plan:    Problem List Items Addressed This Visit       Primary hypertension    Current Assessment & Plan     Hypertension is stable and controlled  Continue current treatment regimen.  Dietary sodium restriction.  Weight loss.  Regular aerobic exercise.  Ambulatory blood pressure monitoring.  Continue CPAP  Blood pressure will be reassessed in 6 months.         Relevant Medications    lisinopril (PRINIVIL,ZESTRIL) 20 MG tablet    Other Relevant Orders    Comprehensive Metabolic Panel    TSH Rfx On Abnormal To Free T4    Hemoglobin A1c    Morbid (severe) obesity due to excess calories    Current Assessment & Plan     Patient's (Body mass index is 51.22 kg/m².) indicates that they are obese (BMI >30) with health conditions that include obstructive sleep apnea, hypertension, and NAFLD  . Weight is unchanged. BMI  is above average; BMI management plan is completed. We discussed low calorie, low carb based diet program, portion control, increasing exercise, and joining a fitness center or start home based exercise program. Recommend nutritional counseling and Mediterranean diet. Per  min aerobic physical activity weekly  Consider phentermine or Wegovy         Relevant Orders    Lipid Panel    TSH Rfx On Abnormal To Free T4    Hemoglobin A1c    PRICE (obstructive sleep apnea) - Primary    Current Assessment &  Plan     Recommended updated sleep study.  Continue CPAP diet and exercise         Relevant Orders    CBC (No Diff)    Ambulatory Referral to Sleep Medicine    Anxiety    NAFL (nonalcoholic fatty liver)    Allergic rhinitis    Relevant Medications    fluticasone (FLONASE) 50 MCG/ACT nasal spray    loratadine (Claritin) 10 MG tablet    montelukast (Singulair) 10 MG tablet     Other Visit Diagnoses       Other fatigue        Relevant Orders    Testosterone            Plan of care was reviewed with patient at the conclusion of today's visit. Counseled patient with regards to good nutrition and diet. Maintaining a healthy lifestyle including exercise and physical activities. Spoke with patient on ways to reduce stress, getting adequate sleep and injury prevention.  Discussed prostate cancer screening, colon cancer screening including benefit of early detection and potential need for follow-up. Patient agrees to screenings today. Annual dental and eye exams were encouraged. Encouraged patient to continue to follow up with annual immunizations.     Return in about 6 months (around 12/13/2024) for htn joanie anxiety.    Franklin Gutierrez MD      Please note than portions of this note were completed "[employer] a Voice Recognition Program

## 2024-06-13 NOTE — ASSESSMENT & PLAN NOTE
Hypertension is stable and controlled  Continue current treatment regimen.  Dietary sodium restriction.  Weight loss.  Regular aerobic exercise.  Ambulatory blood pressure monitoring.  Continue CPAP  Blood pressure will be reassessed in 6 months.

## 2024-06-14 NOTE — PROGRESS NOTES
Vidal's labs look really good. Testosterone normal at 400. Cholesterol is fine. Physicsl activity will help bring up good cholesterol. Cbc normal as are glucose, liver and kidneys.

## 2024-09-11 ENCOUNTER — OFFICE VISIT (OUTPATIENT)
Dept: SLEEP MEDICINE | Age: 34
End: 2024-09-11
Payer: COMMERCIAL

## 2024-09-11 VITALS
BODY MASS INDEX: 45.1 KG/M2 | SYSTOLIC BLOOD PRESSURE: 140 MMHG | DIASTOLIC BLOOD PRESSURE: 70 MMHG | OXYGEN SATURATION: 98 % | TEMPERATURE: 97.1 F | HEIGHT: 70 IN | WEIGHT: 315 LBS | HEART RATE: 82 BPM

## 2024-09-11 DIAGNOSIS — G47.10 HYPERSOMNIA WITH SLEEP APNEA: ICD-10-CM

## 2024-09-11 DIAGNOSIS — E66.01 MORBID (SEVERE) OBESITY DUE TO EXCESS CALORIES: ICD-10-CM

## 2024-09-11 DIAGNOSIS — G47.33 OBSTRUCTIVE SLEEP APNEA, ADULT: Primary | ICD-10-CM

## 2024-09-11 DIAGNOSIS — G47.30 HYPERSOMNIA WITH SLEEP APNEA: ICD-10-CM

## 2024-09-11 DIAGNOSIS — G47.26 CIRCADIAN RHYTHM SLEEP DISORDER, SHIFT WORK TYPE: ICD-10-CM

## 2024-09-11 DIAGNOSIS — I10 PRIMARY HYPERTENSION: ICD-10-CM

## 2024-09-11 PROCEDURE — 99203 OFFICE O/P NEW LOW 30 MIN: CPT | Performed by: NURSE PRACTITIONER

## 2024-10-18 RX ORDER — ESCITALOPRAM OXALATE 10 MG/1
10 TABLET ORAL DAILY
Qty: 90 TABLET | Refills: 0 | Status: SHIPPED | OUTPATIENT
Start: 2024-10-18

## 2024-10-18 NOTE — TELEPHONE ENCOUNTER
Caller: Kandice Vidal Skaggs    Relationship: Self    Best call back number:     [phone] (Mobile)     Requested Prescriptions:   Requested Prescriptions     Pending Prescriptions Disp Refills    escitalopram (Lexapro) 10 MG tablet 90 tablet 2     Sig: Take 1 tablet by mouth Daily.      Pharmacy where request should be sent:     [facility] - [address] - [phone] [facility] [phone]      Last office visit with prescribing clinician: 6/13/2024   Last telemedicine visit with prescribing clinician: Visit date not found   Next office visit with prescribing clinician: 12/16/2024     Additional details provided by patient:     PATIENT STATED HE HAS APPROXIMATELY (1) WEEK LEFT OF MEDICATION    PATIENT ALSO STATED HE HAS BEEN DOUBLING THE DOSAGE WITH (20) MG INSTEAD OF (10) MG    PATIENT REQUESTED DOSAGE ON PRESCRIPTION BE CHANGED TO (20) MG FOR THE REFILL    Does the patient have less than a 3 day supply:  [] Yes  [x] No    Would you like a call back once the refill request has been completed: [] Yes [] No    If the office needs to give you a call back, can they leave a voicemail: [] Yes [] No    Merry Brunner Rep   10/18/24 07:45 EDT

## 2024-11-05 ENCOUNTER — OFFICE VISIT (OUTPATIENT)
Dept: FAMILY MEDICINE CLINIC | Facility: CLINIC | Age: 34
End: 2024-11-05
Payer: COMMERCIAL

## 2024-11-05 VITALS
DIASTOLIC BLOOD PRESSURE: 70 MMHG | OXYGEN SATURATION: 98 % | HEIGHT: 70 IN | HEART RATE: 91 BPM | WEIGHT: 315 LBS | BODY MASS INDEX: 45.1 KG/M2 | SYSTOLIC BLOOD PRESSURE: 124 MMHG

## 2024-11-05 DIAGNOSIS — M54.2 ANTERIOR NECK PAIN: Primary | ICD-10-CM

## 2024-11-05 DIAGNOSIS — Z72.0 TOBACCO USE: ICD-10-CM

## 2024-11-05 PROBLEM — I89.0 LYMPHEDEMA OF BOTH LOWER EXTREMITIES: Status: ACTIVE | Noted: 2023-07-31

## 2024-11-05 PROCEDURE — 99214 OFFICE O/P EST MOD 30 MIN: CPT

## 2024-11-05 NOTE — PROGRESS NOTES
Office Note     Name: Vidal Woodson    : 1990     MRN: [id number]     Chief Complaint  Neck Pain and Shoulder Pain    Subjective     History of Present Illness:  Vidal Woodson is a 34 y.o. male who presents today for evaluation of neck and shoulder pain.     He states the L shoulder pain is a known problem that he has has previously been referred to PT for, but hadn't started yet. However, he has noticed L sided anterior neck pain for the past few months, which has been causing him anxiety this week. He states the sensation is like a pressure, noted more when he turns head to left side. Feels like it travels up to jaw and L ear at time. Also feeling sensation of popping when he chews. He has not noted a palpable mass in the area. He denies fever, chills, night sweats. He admits that his greatest concern with this neck pain is a recurrence/metastasis of squamous cell carcinoma. He is current on [id number] surveillance with abd/pelvis PET scans.     Today, he endorses sore throat. However, he states that he was doing a nasal rinse last night and dry heaved after until he vomited.       Review of Systems:   Review of Systems   Constitutional:  Negative for chills, fatigue and fever.   HENT:  Positive for sore throat. Negative for congestion.    Respiratory:  Negative for shortness of breath.    Cardiovascular:  Positive for leg swelling. Negative for chest pain.   Gastrointestinal:  Negative for abdominal pain and nausea.   Musculoskeletal:  Positive for neck pain. Negative for neck stiffness.        Past Medical History:   Past Medical History:   Diagnosis Date    Dyspnea on exertion     History of palpitations     Other chest pain     Primary central sleep apnea     Primary hypertension     Vision problem        Past Surgical History:   Past Surgical History:   Procedure Laterality Date    KNEE SURGERY      KNEE SURGERY Right 2008    &        Family History:   Family History   Problem  Relation Age of Onset    Drug abuse Mother     Stroke Maternal Grandmother     Diabetes Maternal Grandfather        Social History:   Social History     Socioeconomic History    Marital status:    Tobacco Use    Smoking status: Former     Current packs/day: 0.00     Average packs/day: 0.3 packs/day for 1 year (0.3 ttl pk-yrs)     Types: Cigarettes     Start date: 2010     Quit date: 2011     Years since quittin.8    Smokeless tobacco: Current   Vaping Use    Vaping status: Never Used   Substance and Sexual Activity    Alcohol use: Not Currently     Alcohol/week: 22.0 standard drinks of alcohol     Types: 12 Cans of beer, 10 Shots of liquor per week     Comment: socially    Drug use: No    Sexual activity: Yes     Partners: Female       Immunizations:   Immunization History   Administered Date(s) Administered    31-influenza Vac Quardvalent Preservativ 2013    COVID-19 (PFIZER) Purple Cap Monovalent 2021, 2022    Fluzone  >6mos 2013, 10/01/2024    Hepatitis A 10/31/2018, 2019    MMR 10/01/2024    Tdap 2022        Medications:     Current Outpatient Medications:     albuterol sulfate  (90 Base) MCG/ACT inhaler, Inhale 2 puffs Every 4 (Four) Hours As Needed for Wheezing (15 min prior to exercise)., Disp: 18 g, Rfl: 3    escitalopram (Lexapro) 10 MG tablet, Take 1 tablet by mouth Daily., Disp: 90 tablet, Rfl: 0    fluticasone (FLONASE) 50 MCG/ACT nasal spray, 2 sprays into the nostril(s) as directed by provider Daily., Disp: 5 mL, Rfl: 6    glucose blood test strip, Use as instructed, Disp: 50 each, Rfl: 12    glucose monitor monitoring kit, 1 each As Needed (hypoglyccemia)., Disp: 1 each, Rfl: 1    lisinopril (PRINIVIL,ZESTRIL) 20 MG tablet, Take 0.5 tablets by mouth Daily., Disp: 90 tablet, Rfl: 3    Allergies:   No Known Allergies    Objective     Vital Signs  /70 (BP Location: Right arm, Patient Position: Sitting, Cuff Size: Adult)   Pulse 91    "Ht 177.8 cm (70\")   Wt (!) 169 kg (373 lb)   SpO2 98%   BMI 53.52 kg/m²   Estimated body mass index is 53.52 kg/m² as calculated from the following:    Height as of this encounter: 177.8 cm (70\").    Weight as of this encounter: 169 kg (373 lb).           Physical Exam  Vitals reviewed.   Constitutional:       Appearance: Normal appearance.   HENT:      Head: Normocephalic and atraumatic.      Nose: Nose normal.      Mouth/Throat:      Mouth: Mucous membranes are moist.      Pharynx: Oropharynx is clear.   Eyes:      Pupils: Pupils are equal, round, and reactive to light.   Cardiovascular:      Rate and Rhythm: Normal rate and regular rhythm.      Pulses: Normal pulses.      Heart sounds: Normal heart sounds.   Pulmonary:      Effort: Pulmonary effort is normal. No respiratory distress.      Breath sounds: Normal breath sounds.   Musculoskeletal:      Cervical back: Normal range of motion. No rigidity.      Right lower leg: No edema.      Left lower leg: Edema present.   Lymphadenopathy:      Cervical: No cervical adenopathy.   Neurological:      Mental Status: He is alert.          Procedures     Results:  No results found for this or any previous visit (from the past 24 hours).     Assessment and Plan     Assessment/Plan:  Assessment & Plan  Anterior neck pain  Discussed possible etiologies for pain: musculoskeletal vs. Lymph node involvement. 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 As for lymph node involvement, reassuring factors are absence of B-symptoms and absence of palpable mass. Offered to order neck imaging to definitively rule out, since surveillance imagine involves abdomen/pelvis. However, he is going to start PT soon and will mention this pain to PT to work on. He states he is willing to try this first before pursuing more imaging and exposure to " radiation.   Patient has upcoming annual physical with Dr. Gutierrez and if pain persists, he will ask for neck imaging at that appointment.     Regarding sore throat- negative today for COVID, Flu, and strep.        Tobacco use  Patient is interested in tobacco cessation. Used to smoke cigarettes, now uses chewing tobacco. Notices that he is chewing more tobacco recently as stress is increased. Factors favorable for cessation include health and setting an example for children. Discussed variety of options including NRT and pharmacotherapy. He will consider these options and discuss at upcoming annual physical.            Follow Up: 12/16/2024 with Dr. Matt Worthy PA-C   [facility]

## 2024-11-16 DIAGNOSIS — I10 PRIMARY HYPERTENSION: ICD-10-CM

## 2024-11-16 RX ORDER — LISINOPRIL 10 MG/1
10 TABLET ORAL DAILY
Qty: 90 TABLET | Refills: 0 | Status: SHIPPED | OUTPATIENT
Start: 2024-11-16

## 2024-12-16 ENCOUNTER — OFFICE VISIT (OUTPATIENT)
Dept: FAMILY MEDICINE CLINIC | Facility: CLINIC | Age: 34
End: 2024-12-16
Payer: COMMERCIAL

## 2024-12-16 VITALS
OXYGEN SATURATION: 97 % | WEIGHT: 315 LBS | HEIGHT: 70 IN | HEART RATE: 71 BPM | BODY MASS INDEX: 45.1 KG/M2 | DIASTOLIC BLOOD PRESSURE: 84 MMHG | SYSTOLIC BLOOD PRESSURE: 141 MMHG

## 2024-12-16 DIAGNOSIS — G47.33 OSA (OBSTRUCTIVE SLEEP APNEA): ICD-10-CM

## 2024-12-16 DIAGNOSIS — F17.200 TOBACCO USE DISORDER: ICD-10-CM

## 2024-12-16 DIAGNOSIS — I10 PRIMARY HYPERTENSION: ICD-10-CM

## 2024-12-16 DIAGNOSIS — F41.9 ANXIETY: Primary | ICD-10-CM

## 2024-12-16 PROCEDURE — 99214 OFFICE O/P EST MOD 30 MIN: CPT | Performed by: INTERNAL MEDICINE

## 2024-12-16 RX ORDER — VARENICLINE TARTRATE 0.5 (11)-1
KIT ORAL
Qty: 1 EACH | Refills: 0 | Status: SHIPPED | OUTPATIENT
Start: 2024-12-16 | End: 2025-01-13

## 2024-12-16 RX ORDER — VARENICLINE TARTRATE 1 MG/1
1 TABLET, FILM COATED ORAL 2 TIMES DAILY
Qty: 56 TABLET | Refills: 1 | Status: SHIPPED | OUTPATIENT
Start: 2025-01-13 | End: 2025-03-10

## 2024-12-16 RX ORDER — LISINOPRIL 10 MG/1
10 TABLET ORAL DAILY
Qty: 90 TABLET | Refills: 0 | Status: SHIPPED | OUTPATIENT
Start: 2024-12-16

## 2024-12-16 RX ORDER — BUSPIRONE HYDROCHLORIDE 10 MG/1
10 TABLET ORAL 2 TIMES DAILY
Qty: 180 TABLET | Refills: 2 | Status: SHIPPED | OUTPATIENT
Start: 2024-12-16

## 2024-12-16 RX ORDER — ESCITALOPRAM OXALATE 20 MG/1
20 TABLET ORAL DAILY
Qty: 90 TABLET | Refills: 2 | Status: SHIPPED | OUTPATIENT
Start: 2024-12-16

## 2024-12-16 NOTE — PROGRESS NOTES
Vidal Woodson  1990  [id number]  Patient Care Team:  Franklin Gutierrez MD as PCP - General (Internal Medicine)    Vdial Woodson is a 34 y.o. male here today for follow up.     This patient is accompanied by their self who contributes to the history of their care.    Chief Complaint:    Chief Complaint   Patient presents with    Anxiety     DOUBLED up on medication 10mg to 20mg not feeling any difference.    Hypertension    Sleep Apnea    Nicotine Dependence     Dip          History of Present Illness:  I have reviewed and/or updated the patient's past medical, past surgical, family, social history, problem list and allergies as appropriate.     In March  he agreed to be placed on Lexapro 10 mg daily for severe anxiety.  Increase to 20 mg daily.  He has tolerated this with initial improvement in anxiety. He still have moments of anxiety, but he has found himself to be far less reactive.   Worked with a counselor- not currently. No panic attacks. He has trouble focusing and concentrating. He has noted a facial twitch when anxious. He feels overwhelmed. Currently in a paramedic class. Sleep in okay. Projects at home are fine. Has been on 20 mg for a couple weeks now. He tends to be a perfectionist. He has no palpitations or breathing.       As part of his oncologic diagnosis, CT was showed evidence of fatty liver. He denies prutiis skin/scleral changes. Drinks diet pop. , Is meeting wit nutritionist soon. Is exercising. Typically does not eat out.      Intermittent sinsus/nasal congestions and ear pressure. Denies fevers/chills. Sx worse since having chemo.  No fevers or chills or purulent discharge.     Blood pressure, he remains on lisinopril 20 mg daily.  Denies any orthostatic changes.    He would like to stop oral tobacco- has been up to 3 cans- currently 1.5 daily. Would consider chantix    No medications in 4 days. Stuggling  with cpap    Working with a dietician, has resumed gym  "activity    Review of Systems   Constitutional:  Positive for fatigue.   Respiratory:  Positive for apnea.        Vitals:    12/16/24 0848   BP: 141/84   Pulse: 71   SpO2: 97%   Weight: (!) 171 kg (376 lb 3.2 oz)   Height: 177.8 cm (70\")     Body mass index is 53.98 kg/m².    Physical Exam  Constitutional:       Appearance: Normal appearance. He is obese.   HENT:      Head: Normocephalic and atraumatic.   Cardiovascular:      Rate and Rhythm: Normal rate and regular rhythm.      Pulses: Normal pulses.      Heart sounds: Normal heart sounds.   Psychiatric:         Mood and Affect: Mood normal.         Behavior: Behavior normal.         Procedures    Results Review:    None    Assessment/Plan:    Problem List Items Addressed This Visit       Primary hypertension    Relevant Medications    lisinopril (PRINIVIL,ZESTRIL) 10 MG tablet    PRICE (obstructive sleep apnea)    Relevant Orders    Ambulatory Referral to Sleep Medicine    Tobacco use disorder    Relevant Medications    Varenicline Tartrate, Starter, 0.5 MG X 11 & 1 MG X 42 tablet therapy pack    varenicline (Chantix Continuing Month Luis) 1 MG tablet (Start on 1/13/2025)    Anxiety - Primary    Relevant Medications    busPIRone (BUSPAR) 10 MG tablet    escitalopram (Lexapro) 20 MG tablet       Plan of care reviewed with patient at the conclusion of today's visit. Education was provided regarding diagnosis and management.  Patient verbalizes understanding of and agreement with management plan.    Return in about 3 months (around 3/16/2025) for anxiety htn nicotine.    Franklin Gutierrez MD      Please note than portions of this note were completed wt a Voice Recognition Program          "

## 2025-01-20 ENCOUNTER — OFFICE VISIT (OUTPATIENT)
Dept: FAMILY MEDICINE CLINIC | Facility: CLINIC | Age: 35
End: 2025-01-20
Payer: COMMERCIAL

## 2025-01-20 VITALS
WEIGHT: 315 LBS | DIASTOLIC BLOOD PRESSURE: 94 MMHG | OXYGEN SATURATION: 97 % | HEART RATE: 97 BPM | BODY MASS INDEX: 45.1 KG/M2 | HEIGHT: 70 IN | TEMPERATURE: 99.3 F | SYSTOLIC BLOOD PRESSURE: 156 MMHG

## 2025-01-20 DIAGNOSIS — R50.9 FEVER, UNSPECIFIED FEVER CAUSE: Primary | ICD-10-CM

## 2025-01-20 DIAGNOSIS — J10.1 INFLUENZA A (H1N1): ICD-10-CM

## 2025-01-20 LAB
EXPIRATION DATE: ABNORMAL
FLUAV AG UPPER RESP QL IA.RAPID: DETECTED
FLUBV AG UPPER RESP QL IA.RAPID: NOT DETECTED
INTERNAL CONTROL: ABNORMAL
Lab: ABNORMAL
SARS-COV-2 AG UPPER RESP QL IA.RAPID: NOT DETECTED

## 2025-01-20 PROCEDURE — 99214 OFFICE O/P EST MOD 30 MIN: CPT | Performed by: INTERNAL MEDICINE

## 2025-01-20 PROCEDURE — 87428 SARSCOV & INF VIR A&B AG IA: CPT | Performed by: INTERNAL MEDICINE

## 2025-01-20 RX ORDER — GUAIFENESIN, PSEUDOEPHEDRINE HYDROCHLORIDE 600; 60 MG/1; MG/1
1 TABLET, EXTENDED RELEASE ORAL EVERY 12 HOURS
Qty: 20 TABLET | Refills: 1 | Status: SHIPPED | OUTPATIENT
Start: 2025-01-20

## 2025-01-20 RX ORDER — OSELTAMIVIR PHOSPHATE 75 MG/1
75 CAPSULE ORAL 2 TIMES DAILY
Qty: 10 CAPSULE | Refills: 0 | Status: SHIPPED | OUTPATIENT
Start: 2025-01-20

## 2025-01-20 RX ORDER — IPRATROPIUM BROMIDE AND ALBUTEROL SULFATE 2.5; .5 MG/3ML; MG/3ML
3 SOLUTION RESPIRATORY (INHALATION) EVERY 4 HOURS PRN
Qty: 120 ML | Refills: 0 | Status: SHIPPED | OUTPATIENT
Start: 2025-01-20

## 2025-01-20 RX ORDER — BENZONATATE 100 MG/1
100 CAPSULE ORAL 3 TIMES DAILY PRN
Qty: 60 CAPSULE | Refills: 0 | Status: SHIPPED | OUTPATIENT
Start: 2025-01-20

## 2025-01-20 NOTE — PROGRESS NOTES
"Vidal Woodson  1990  [id number]  Patient Care Team:  Franklin Gutierrez MD as PCP - General (Internal Medicine)    Vidal Woodson is a 34 y.o. male here today for follow up.     This patient is accompanied by their self who contributes to the history of their care.    Chief Complaint:    Chief Complaint   Patient presents with    Generalized Body Aches    URI    Fever        History of Present Illness:  I have reviewed and/or updated the patient's past medical, past surgical, family, social history, problem list and allergies as appropriate.     Vidal is here complaining of severe myalgias fevers and upper respiratory symptoms. Fevers and myalgia started Saturday, He has had no appetite. He has had a cough theer has been exertional LEOS. He has been pushing fluids. He has had no ST, ear pressure has been prominent. Clear PND. He is taking PSE gaufenisin.     Review of Systems   Constitutional:  Positive for fatigue and fever.   Respiratory:  Positive for cough.    Musculoskeletal:  Positive for myalgias.       Vitals:    01/20/25 1430   BP: 156/94   Pulse: 97   Temp: 99.3 °F (37.4 °C)   SpO2: 97%   Weight: (!) 176 kg (387 lb 9.6 oz)   Height: 177.8 cm (70\")     Body mass index is 55.61 kg/m².    Physical Exam  Vitals and nursing note reviewed.   Constitutional:       General: He is not in acute distress.     Appearance: He is well-developed. He is ill-appearing. He is not diaphoretic.   HENT:      Head: Normocephalic and atraumatic.      Right Ear: External ear normal.      Left Ear: External ear normal.      Mouth/Throat:      Pharynx: No oropharyngeal exudate.   Eyes:      General: No scleral icterus.        Right eye: No discharge.      Conjunctiva/sclera: Conjunctivae normal.   Neck:      Thyroid: No thyromegaly.      Vascular: No JVD.      Trachea: No tracheal deviation.   Cardiovascular:      Rate and Rhythm: Normal rate and regular rhythm.      Heart sounds: Normal heart sounds.      Comments: " PMI nondisplaced  Pulmonary:      Effort: Pulmonary effort is normal. No respiratory distress.      Breath sounds: Normal breath sounds. No wheezing or rales.   Abdominal:      General: Bowel sounds are normal.      Palpations: Abdomen is soft.      Tenderness: There is no abdominal tenderness. There is no guarding or rebound.   Musculoskeletal:      Cervical back: Normal range of motion and neck supple.   Lymphadenopathy:      Cervical: No cervical adenopathy.   Skin:     General: Skin is warm and dry.      Capillary Refill: Capillary refill takes less than 2 seconds.      Coloration: Skin is not pale.      Findings: No rash.   Neurological:      Mental Status: He is alert and oriented to person, place, and time.      Motor: No abnormal muscle tone.      Coordination: Coordination normal.   Psychiatric:         Judgment: Judgment normal.         Procedures    Results Review:    I reviewed the patient's new clinical results.        Component  Ref Range & Units 14:48   SARS Antigen  Not Detected, Presumptive Negative Not Detected   Influenza A Antigen FAISAL  Not Detected Detected Abnormal    Influenza B Antigen FAISAL  Not Detected Not Detected   Internal Control  Passed Passed   Lot Number [id number]   Expiration Date 01/01/2026        Assessment/Plan:    Problem List Items Addressed This Visit       Influenza A (H1N1)    Relevant Medications    oseltamivir (Tamiflu) 75 MG capsule    pseudoephedrine-guaifenesin (MUCINEX D)  MG per 12 hr tablet    benzonatate (Tessalon Perles) 100 MG capsule    ipratropium-albuterol (DUO-NEB) 0.5-2.5 mg/3 ml nebulizer     Other Visit Diagnoses       Fever, unspecified fever cause    -  Primary    Relevant Medications    oseltamivir (Tamiflu) 75 MG capsule    pseudoephedrine-guaifenesin (MUCINEX D)  MG per 12 hr tablet    benzonatate (Tessalon Perles) 100 MG capsule    Other Relevant Orders    POCT SARS-CoV-2 + Flu Antigen FAISAL (Completed)            Plan of care reviewed with  patient at the conclusion of today's visit. Education was provided regarding diagnosis and management.  Patient verbalizes understanding of and agreement with management plan.    No follow-ups on file.    Franklin Gutierrez MD      Please note than portions of this note were completed wt a Voice Recognition Program

## 2025-01-20 NOTE — LETTER
January 20, 2025     Patient: Vidal Woodson   YOB: 1990   Date of Visit: 1/20/2025       To Whom It May Concern:    It is my medical opinion that Vidal Woodson may return to work in three days. Should be excused for today as well           Sincerely,        Franklin Gutierrez MD    CC: No Recipients